# Patient Record
Sex: FEMALE | Race: WHITE | NOT HISPANIC OR LATINO | Employment: FULL TIME | URBAN - METROPOLITAN AREA
[De-identification: names, ages, dates, MRNs, and addresses within clinical notes are randomized per-mention and may not be internally consistent; named-entity substitution may affect disease eponyms.]

---

## 2017-01-12 ENCOUNTER — GENERIC CONVERSION - ENCOUNTER (OUTPATIENT)
Dept: OTHER | Facility: OTHER | Age: 37
End: 2017-01-12

## 2017-04-19 ENCOUNTER — GENERIC CONVERSION - ENCOUNTER (OUTPATIENT)
Dept: OTHER | Facility: OTHER | Age: 37
End: 2017-04-19

## 2017-04-25 ENCOUNTER — GENERIC CONVERSION - ENCOUNTER (OUTPATIENT)
Dept: OTHER | Facility: OTHER | Age: 37
End: 2017-04-25

## 2018-01-10 NOTE — MISCELLANEOUS
Provider Comments  Provider Comments:   PT WAS A NO SHOW FOR [facility] APPT I CALLED AND LEFT A MSG FOR PT TO CALL AND RESCHEDULE, SENT A LETTER AND ASK OUR  TO REACH OUT TO PATIENT      Signatures   Electronically signed by : SABI Crews; Jan 12 2017 11:01AM EST                       (Author)    Electronically signed by :  SABI Crews; Jan 12 2017 11:04AM EST                       (Author)

## 2018-01-11 NOTE — MISCELLANEOUS
Message  Message Free Text Note Form: To Whom It May Concern:    Ms Feliciano Vieira is under my direct care at [address]  She is currently pregnant and should not be lifting anything heavier than 25 lbs or climbing on ladders  Thank you for understanding  Dr Merlin Valdez MD  Ob/Gyn      Plan    1  (1) CBC/PLT/DIFF; Status:Active; Requested for:47Awj4437;    2  (1) GLUCOSE, 1HR PG (50gm Glu Challenge Preg-Pts); Status:Active; Requested   for:12Nfe3137;    3  (1) RPR; Status:Active;  Requested for:45Ouh1390;    4  OB Global Urine Dip Non-Automated - POC; Status:Resulted - Requires   Verification,Retrospective By Protocol Authorization;   Done: [id number] 08:18AM    Signatures   Electronically signed by : CORIN Daniel ; Jul 21 2016  8:34AM EST                       (Author)    Electronically signed by : Olga Nuno MD; Aug  1 2016  8:56AM EST

## 2018-01-12 NOTE — MISCELLANEOUS
Reason For Visit  Reason For Visit Free Text Note Form: Received request to contact pt  regarding missed appointment  Call to pt  and left message  Certified letter mailed to pt  today including introduction to Social Work services available at [facility]  Request for pt  to contact [facility] and contact information included as well  Letter scanned to EMR  Will continue to be available to provide support  Active Problems    1  Asthma (493 90) (J45 909)   2  Encounter for Depo-Provera contraception (V25 49) (Z30 42)   3  High grade squamous intraepithelial cervical dysplasia (795 04) (R87 613)   4  History of seasonal allergies (V15 09) (Z88 9)   5  Postpartum exam (V24 2) (Z39 2)   6  History of Tobacco use (305 1) (Z72 0)   7  Tooth pain (525 9) (K08 89)    Current Meds   1  Albuterol Sulfate SOLR;   Therapy: (Recorded:49Ojt9389) to Recorded   2  Claritin 10 MG Oral Tablet; TAKE 1 TABLET DAILY AS NEEDED; Therapy: 22CPW4916 to (Evaluate:10Nov2016)  Requested for: 20EAT3516; Last   Rx:11Oct2016 Ordered   3  Claritin TABS; Therapy: (Dana Jc) to Recorded   4  Colace 100 MG Oral Capsule; TAKE 1 CAPSULE TWICE DAILY AS NEEDED; Therapy: 76Kpn2850 to (Evaluate:51Cyu9113)  Requested for: 42Yoz3193; Last   Rx:53Pog8640 Ordered   5  EpiPen 2-Thuan 0 3 MG/0 3ML Injection Solution Auto-injector; 0 3 mg SC/IM x1; Info: may   repeat dose x1;   Therapy: 13KQT0181 to (Last Rx:89Olz3987)  Requested for: 31DPZ3556 Ordered   6  EpiPen 2-Thuan 0 3 MG/0 3ML Injection Solution Auto-injector; INJECT 0 3ML   INTRAMUSCULARLY AS DIRECTED; Therapy: 88SBY5500 to (Last AW:65REA5599)  Requested for: 99CFK0975 Ordered   7  Ferrous Sulfate 325 (65 Fe) MG Oral Tablet; take 1 tablet by mouth twice daily; Therapy: 46Exc6835 to (Evaluate:68Oln9268)  Requested for: 21Kit2612; Last   Rx:47Ozn0411 Ordered   8  MedroxyPROGESTERone Acetate 150 MG/ML Intramuscular Suspension; INJECT 150    MG Intramuscular every 11 weeks;    Therapy: 35CAY0679 to (Last Rx:74Diq9631)  Requested for: 22Ybp0254 Ordered   9  Prenatal Vitamins 0 8 MG Oral Tablet; TAKE 1 TABLET DAILY; Therapy: 15Apr2016 to (Evaluate:45Drv6456)  Requested for: 15Apr2016; Last   Rx:15Apr2016 Ordered   10  Ventolin  (90 Base) MCG/ACT Inhalation Aerosol Solution; INHALE 1 TO 2 PUFFS    EVERY 4 TO 6 HOURS AS NEEDED; Therapy: 99WZZ8657 to (Last Rx:82Hrt2438)  Requested for: 35Tmj1077 Ordered    Allergies    1  Codeine Sulfate TABS   2  Penicillins   3  Pineapple Concentrate LIQD   4  Sulfur   5  Vancomycin HCl SOLR    6  Bee sting   7   Other    Signatures   Electronically signed by : RAMIRO Caicedo; Jan 12 2017 12:17PM EST                       (Author)

## 2018-01-12 NOTE — MISCELLANEOUS
Message  Return to work or school:   Chapin Conner is under my professional care  She was seen in my office on 12/7/16   She is able to return to work on  12/8/16    She is able to perform activities of daily living without limitations  , She is able to participate in sports/gym without limitations  , She can perform work without limitations  , She can perform housework without limitations  Weight Bearing Status: Full Weight-Bearing           Signatures   Electronically signed by : CORIN Dey ; Dec  7 2016  5:43PM EST                       (Author)

## 2018-01-14 NOTE — PROGRESS NOTES
2016         RE: Jody Duncan                      To: [facility]   MR#: [id number]                                    [address] [place] [address]   :  [address]  ENC: [id number]                             [place], [address]   (Exam #: [id number])                           Fax: [phone]      The LMP of this 28year old,  G4, P3-0-0-3 patient was unknown, her   working JOSEFA is NOV 15 2016 and the current gestational age is 25 weeks 0   days by  [facility] [place] [address]  A sonographic examination was performed on 2016 using real time equipment  The ultrasound examination was   performed using abdominal & vaginal techniques  The patient has a BMI of   27 0  Her blood pressure today was 101/67  Earliest ultrasound found in her record: 16  12w2d  JOSEFA 11/15/16   Eduardo Reyna reports she is on prenatal vitamins and uses a Ventolin inhaler   prn for asthma  She has an allergy to penicillin, codeine, sulfur and   vancomycin  Her past medical history is significant for advanced maternal   age, asthma, and abnormal Pap smears showing high grade ADRIANA and she is   positive for high risk HPV found in this pregnancy  She has a colposcopy   scheduled for tomorrow  At her 12 week ob to visit she was given a   referral slip to schedule a sequential screen was ultimately she neglected   to do  She reports a history of 3 term vaginal deliveries that weighed   between 7 lbs  10 oz  and 8 lbs  7 oz  Her surgical history includes   removal of a bronchial cleft cyst in   Prior to pregnancy she was   smoking 2 packs of cigarettes daily which currently now is down to 5   cigarettes daily  She denies any use of alcohol or illicit drugs  She   denies any first generation family history of hypertension, diabetes,   thrombosis or congenital anomalies        Cardiac motion was observed at 141 bpm  INDICATIONS      fetal anatomical survey   advanced maternal age      Exam Types      LEVEL II   Transvaginal      RESULTS      Fetus # 1 of 1   Vertex presentation   Fetal growth appeared normal   Placenta Location = Anterior   No placenta previa   Placenta Grade = I      MEASUREMENTS (* Included In Average GA)      AC              14 3 cm        19 weeks 2 days* (38%)   BPD              4 6 cm        20 weeks 0 days* (52%)   HC              17 9 cm        20 weeks 1 day * (54%)   Femur            3 1 cm        19 weeks 6 days* (38%)      Nuchal Fold      4 8 mm      Humerus          3 0 cm        19 weeks 5 days  (46%)   Radius           2 5 cm        20 weeks 3 days   Ulna             2 8 cm        20 weeks 1 day   Tibia            2 9 cm        20 weeks 5 days  (66%)   Fibula           2 9 cm        19 weeks 6 days   Foot             3 2 cm        19 weeks 6 days      Cerebellum       2 1 cm        20 weeks 4 days   Biorbit          3 1 cm        20 weeks 1 day   CisternaMagna    1 6 mm      HC/AC           1 25   FL/AC           0 22   FL/BPD          0 68   EFW (Ac/Fl/Hc)   311 grams - 0 lbs 11 oz      THE AVERAGE GESTATIONAL AGE is 19 weeks 6 days +/- 10 days  AMNIOTIC FLUID         Largest Vertical Pocket = 4 2 cm   Amniotic Fluid: Normal      UTERINE ARTERIES                                  S/D   PI    RI    NOTCH       Left Uterine Artery        2 29  0 93  0 56       Right Uterine Artery       2 02  0 74  0 51      CERVICAL EVALUATION      The cervix appeared normal (Ultrasound Examination)  SUPINE      Cervical Length: 3 70 cm      OTHER TEST RESULTS           Funneling?: No             Dynamic Changes?: No        Resp  To TFP?: No      ANATOMY      Head                                    Normal   Face/Neck                               Normal   Th  Cav  Normal   Heart                                   See Details   Abd  Cav  Normal   Stomach                                 Normal   Right Kidney                            Normal   Left Kidney                             Normal   Bladder                                 Normal   Abd  Wall                               Normal   Spine                                   Normal   Extrems                                 Normal   Genitalia                               Normal   Placenta                                Normal   Umbl  Cord                              Normal   Uterus                                  Normal   PCI                                     Normal      ANATOMY DETAILS      Visualized Appearing Sonographically Normal:   HEAD: (Calvarium, BPD Level, Cavum, Lateral Ventricles, Choroid Plexus,   Cerebellum, Cisterna Magna);    FACE/NECK: (Neck, Nuchal Fold, Profile,   Orbits, Nose/Lips, Palate, Face);    TH  CAV : (Diaphragm); HEART:   (Four Chamber View, Proximal Left Outflow, Proximal Right Outflow, 3   Vessel Trachea, Interventricular Septum, Interatrial Septum, IVC, SVC,   Cardiac Axis, Cardiac Position);    ABD  CAV , STOMACH, RIGHT KIDNEY, LEFT   KIDNEY, BLADDER, ABD  WALL, SPINE: (Cervical Spine, Thoracic Spine, Lumbar   Spine, Sacrum);    EXTREMS: (Lt Humerus, Rt Humerus, Lt Forearm, Rt   Forearm, Lt Hand, Rt Hand, Lt Femur, Rt Femur, Lt Low Leg, Rt Low Leg, Lt   Foot, Rt Foot);    GENITALIA (Male), PLACENTA, UMBL  CORD, UTERUS, PCI      Not Visualized:   HEART: (Short Axis of Greater Vessels, Ductal Arch, Aortic Arch)      ADNEXA      The left ovary appeared normal and measured 2 2 x 1 7 x 1 7 cm with a   volume of 3 3 cc  The right ovary appeared normal and measured 3 5 x 3 1 x   1 4 cm with a volume of 7 9 cc        IMPRESSION      Portillo IUP   19 weeks and 6 days by this ultrasound  (JOSEFA=NOV 16 2016)   20 weeks and 0 days by [facility] Tri Sono  (JOSEFA=NOV 15 2016)   Vertex presentation   Fetal growth appeared normal   Regular fetal heart rate of 141 bpm   Anterior placenta   No placenta previa      GENERAL COMMENT      As per your request, a consultation was performed on your patient for the   following indication: AMA      Risks:   1  AMA- with a 0 97 % risk for any type of aneuploidy at the time of an   amniocentesis  We discussed the following options for genetic screening  US is not a good screen for aneuploidy as it may miss up to 50% of the   babies with a chromosome problem  She understands that Cell free DNA screening can  99% of babies   with Downs and 97% of babies with T18  The false positive rate for Downs   and T18 if found is 0 1%  The test also can  7/11 babies with T13   with false positive rate of 0 2%  It is not a great screen for other   chromosome problems which her age puts her at risk for  This test is new   and is not always covered by insurance  Lastly she was offered invasive genetic testing  Options for prenatal   diagnosis discussed included an amniocentesis  Risks and benefits of an   amniocentesis were reviewed including an increased risk of loss of 0 5%   over the baseline risk  It was explained that normal chromosomes and a   normal ultrasound do not guarantee a normal baby nor a normal pregnancy   outcome  The patient was informed of the findings and counseled about the   limitations of the exam in detecting all forms of fetal congenital   abnormalities  She denies any vaginal bleeding or uterine cramping/contractions  Exam shows she is comfortable and her abdomen is nontender  Patient verbalizes understanding and declines having an amniocentesis at   todays visit  She is planning on having Cell free DNA testing done   Thursday as she could not wait for verification of her insurance coverage   at the end of her scan today  Insurance was contacted and NIPT is covered   and she was notified to schedule her blood draw        Follow up recommended:   Recommend a follow up 32 week growth scan due to her age  Will review the   missed anatomy at her next [address]  She is coming in for her blood draw this   Thursday  JOSIAH Sanchez M D     Maternal-Fetal Medicine   Electronically signed 06/28/16 13:32

## 2018-01-14 NOTE — PROGRESS NOTES
SEP 22 2016         RE: Nalini Duncan                      To: Alber Daniels   MR#: [id number]                                    [address] [place] [address]   : TESS Ball  ENC: [id number]                             [place], [address]   (Exam #: [id number])                           Fax: [phone]      The LMP of this 39year old,  G4, P3-0-0-3 patient was unknown, her   working JOSEFA is NOV 15 2016 and the current gestational age is 26 weeks 2   days by 1st Trimester Sono  A sonographic examination was performed on SEP   22 2016 using real time equipment  The ultrasound examination was   performed using abdominal technique  The patient has a BMI of 28 3  Her   blood pressure today was 115/76  Earliest ultrasound found in her record: 16  12w2d  JOSEFA 11/15/16      Cardiac motion was observed at 129 bpm       INDICATIONS      advanced maternal age   fetal growth   Evaluate missed anatomy      Exam Types      Level I      RESULTS      Fetus # 1 of 1   Vertex presentation   Fetal growth appeared normal   Placenta Location = Anterior   No placenta previa   Placenta Grade = I      MEASUREMENTS (* Included In Average GA)      AC              28 7 cm        32 weeks 6 days* (55%)   BPD              7 9 cm        31 weeks 5 days* (30%)   HC              29 6 cm        32 weeks 2 days* (34%)   Femur            6 1 cm        31 weeks 3 days* (35%)      HC/AC           1 03   FL/AC           0 21   FL/BPD          0 77   EFW (Ac/Fl/Hc)  1951 grams - 4 lbs 5 oz                 (44%)      THE AVERAGE GESTATIONAL AGE is 32 weeks 1 day +/- 18 days        AMNIOTIC FLUID      Q1: 3 5      Q2: 5 1      Q3: 5 3      Q4: 3 9   JACINTO Total = 17 7 cm   Amniotic Fluid: Normal      ANATOMY DETAILS      Visualized Appearing Sonographically Normal:   HEAD: (Calvarium, BPD Level, Cavum, Lateral Ventricles, Cerebellum); HEART: (Four Chamber View, Proximal Right Outflow, 3 Vessel Trachea, Short   Axis of Greater Vessels, Ductal Arch, Aortic Arch, Cardiac Axis, Cardiac   Position);    STOMACH, RIGHT KIDNEY, LEFT KIDNEY, BLADDER, PLACENTA      ANATOMY COMMENTS         The prior fetal anatomic survey was limited the area of the short axis and   cardiac arches  These anatomic views were seen today as sonographically   normal within the inherent limitations of fetal ultrasound  IMPRESSION      Portillo IUP   32 weeks and 1 day by this ultrasound  (JOSEFA=NOV 16 2016)   32 weeks and 2 days by 1st Tri Sono  (JOSEFA=NOV 15 2016)   Vertex presentation   Fetal growth appeared normal   Regular fetal heart rate of 129 bpm   Anterior placenta   No placenta previa      GENERAL COMMENT      On exam today the patient appears well, in no acute distress, and denies   any complaints  Her abdomen is non-tender  The fetal anatomic survey is now complete  There is no sonographic   evidence of fetal abnormalities at this time  The remainder of the survey   was completed previously  There has been appropriate interval fetal   growth  Good fetal movement and tone are seen  The amniotic fluid volume   appears normal   The placenta is anterior and it appears sonographically   normal   The patient was informed of today's findings and all of her   questions were answered  The limitations of ultrasound were reviewed with   the patient, which she accepts  Precautions and fetal kick counts were   reviewed  Recommend the patient return as clinically indicated  Thank you very much for allowing us to participate in the care of this   very nice patient  Should you have any questions, please do not hesitate   to contact our office  JOSIAH Urias M D     Electronically signed 09/22/16 13:30

## 2018-01-16 NOTE — MISCELLANEOUS
Provider Comments  Provider Comments:   PT NO SHOW FOR PN LETTER SENT      Signatures   Electronically signed by : Lisa Sanz, ; Aug 23 2016  2:49PM EST                       (Author)    Electronically signed by : SABI Griffin;  Aug 24 2016 12:07PM EST                       (Acknowledgement)

## 2019-01-11 ENCOUNTER — INITIAL PRENATAL (OUTPATIENT)
Dept: OBGYN CLINIC | Facility: CLINIC | Age: 39
End: 2019-01-11

## 2019-01-11 VITALS
SYSTOLIC BLOOD PRESSURE: 102 MMHG | HEIGHT: 66 IN | DIASTOLIC BLOOD PRESSURE: 68 MMHG | WEIGHT: 186.2 LBS | BODY MASS INDEX: 29.92 KG/M2 | HEART RATE: 98 BPM

## 2019-01-11 DIAGNOSIS — Z3A.18 18 WEEKS GESTATION OF PREGNANCY: Primary | ICD-10-CM

## 2019-01-11 PROCEDURE — 99211 OFF/OP EST MAY X REQ PHY/QHP: CPT

## 2019-01-11 RX ORDER — LANOLIN ALCOHOL/MO/W.PET/CERES
400 CREAM (GRAM) TOPICAL DAILY
COMMUNITY
End: 2019-04-18 | Stop reason: SDUPTHER

## 2019-01-11 NOTE — PROGRESS NOTES
OB Intake  o Patient presents for OB intake interview  o Accompanied by: Self    o V2H1002    o Hx of  delivery prior to 36 weeks 6 days:                             no  o LMP:   Patient's last menstrual period was 10/01/2018 (approximate)  o U/S date: 10/30/18   -  7 weeks  4 days  o Estimated Date of Delivery: None noted  - confirmed by U/S    o Signs and Symptoms of pregnancy:               nausea  - Constipation:    no  - Headaches:   no  - Cramping/spotting:    no  - PICA cravings:    no  - Diabetes: If you answer yes, please order 1 hr gtt testing, 50grams   History of gestational diabetes    no   BMI >35     no   Advance maternal age >35   yes   First degree relative with type 2 diabetes    no   History of PCOS   no   Current metformin use    no   Prior history of macrosomia or LGA    no  o Immunization Record  Immunization History   Administered Date(s) Administered    Influenza TIV (IM) 2014    Influenza, Quadrivalent (nasal) 2016    Tdap 2016   -   o Tdap:  - Counseled to be given after 28 weeks  o Influenza vaccine discussed  o MRSA questionnaire:     negative  o Dental visit within last 6 months  - no   - If no, recommendations discussed  Interview education:  Educational material provided on After Visit Summary (AVS)   Handouts given at todays visit  o [facility] [employer] [facility] phone application guide  o [address] [place] [address] support center  o CDCs Response to [address] [place] [address] Maternal Fetal Medicine  - Sequential screening pamphlet  - Cystic fibrosis pamphlet  o JOSEFA letter given  - [address]  - Work   - Dentist    N[facility] Family [facility]:    No  ONAF form submitted:    No    MyChart activated (not 1518 years of age)  No  - Code provided:   Yes    Interview done by: Penny Garibay RN 19    1  18 weeks gestation of pregnancy      [facility]  Pregnant with twins   Records are in care everywhere    Prenatal blood work and genetic testing done at [facility]  Last U/S was done at [facility] on 1/3/19  Hx of 2 blood clots  One when teenager, second one was during her pregnancy in 2008  [facility] wanted the patient to be on Lovenox or Asprin, patient refused both of them  SABI spoke with Dr Ginny Soto regarding hx of blood clots  Since she had a recent U/S on 1/3/19  Dr Ginny Soto feels comfortable with the patient being seen in 2 weeks for her level 2 U/S       Influenza vaccine given already this year

## 2019-01-11 NOTE — PATIENT INSTRUCTIONS
Warning signs during pregnancy      When to Call    1  Vaginal bleeding  2  Sharp abdominal pain that does not go away  3  Fever (more than 100 4 and is not relieved by Tylenol)  4  Persistent vomiting lasting greater than 24 hours  5  Chest pain   6  Pain or burning when you urinate  7  Severe headache that doesn't resolve with Tylenol  8  Blurred vision or seeing spots in your vision  9  Sudden swelling of your face or hands  10  Redness, swelling or pain in a leg  11  A sudden weight gain in just a few days  12  Decrease in your baby's movement (after 28 weeks or the 6th month of pregnancy)  13  A loss of watery fluid from your vagina - can be a gush, a trickle or continuous wetness  14  After 20 weeks of pregnancy, rhythmic cramping (greater than 4 per hour) or menstrual like low/pelvic pain          Medications and Pregnancy: The following list of over-the-counter medications is usually considered safe to take during pregnancy  Take care to not double up on products containing acetaminophen (Tylenol)  Colds/Sore Throat   Robitussin DM - Plain (guaifenesin)   Saline nasal spray   Warm salt water gargle   Cepacol throat lozenges or mouthwash (cetylpyridinium)   Sucrets (hexylresoricinol)    Allergy  AVOID the D - or DECONGESTANT   Claritin (loratadine)   Zrytec (cetirizine)   Allerga (fexofenadine)   Headache/ Aches and Pains    Headaches / Aches and Pains:   Tylenol (acetaminophen)  Do NOT exceed more than 3000 mg of Tylenol in a 24-hour period      Heartburn   Mylanta (aluminum hydroxide/simethicone, magnesium hydroxide)   Maalaox (aluminum magnesium hydroxide, magnesium hydroxide)   Tums (calcium carbonate)   Riopan (magaldrate)    Constipation   Colace (docusate sodium)   Surfak (docusate sodium)   MiraLAX   Glycerin suppositories   Fleets enema (sodium phosphate & sodium biphosphate)    Nausea/Vomiting   Vitamin B6 (pyridoxine) - May take 50 mg at bedtime, 25 mg in the morning, 25 mg in the afternoon   Unisom (doxylamine) - May use for nausea/vomiting - (cut a 25 mg tablet in half)  May cause drowsiness  Sleep   Benadryl (diphenhydramine) - Take 1-2 tablets as needed at bedtime   Unisom (doxylamine) 25 mg tablet - As needed at bedtime   Melatonin 5 mg tablet - As needed at bedtime    Generally the generic form of medicine is usually lower priced than the brand name form of the medicine  Pregnancy   AMBULATORY CARE:   What you need to know about pregnancy:  A normal pregnancy lasts about 40 weeks  The first trimester lasts from your last period through the 12th week of pregnancy  The second trimester lasts from the 13th week of your pregnancy through the 23rd week  The third trimester lasts from your 24th week of pregnancy until your baby is born  If you know the date of your last period, your healthcare provider can estimate your due date  You may give birth to your baby any time from 37 weeks to 2 weeks after your due date  Seek care immediately if:   · You develop a severe headache that does not go away  · You have new or increased vision changes, such as blurred or spotted vision  · You have new or increased swelling in your face or hands  · You have pain or cramping in your abdomen or low back  · You have vaginal bleeding  Contact your healthcare provider or obstetrician if:   · You have abdominal cramps, pressure, or tightening  · You have a change in vaginal discharge  · You cannot keep food or drinks down, and you are losing weight  · You have chills or a fever  · You have vaginal itching, burning, or pain  · You have yellow, green, white, or foul-smelling vaginal discharge  · You have pain or burning when you urinate, less urine than usual, or pink or bloody urine  · You have questions or concerns about your condition or care    Body changes that may occur during your pregnancy:   · Breast changes  you will experience include tenderness and tingling during the early part of your pregnancy  Your breasts will become larger  You may need to use a support bra  You may see a thin, yellow fluid, called colostrum, leak from your nipples during the second trimester  Colostrum is a liquid that changes to milk about 3 days after you give birth  · Skin changes and stretch marks  may occur during your pregnancy  You may have red marks, called stretch marks, on your skin  Stretch marks will usually fade after pregnancy  Use lotion if your skin is dry and itchy  The skin on your face, around your nipples, and below your belly button may darken  Most of the time, your skin will return to its normal color after your baby is born  · Morning sickness  is nausea and vomiting that can happen at any time of day  Avoid fatty and spicy foods  Eat small meals throughout the day instead of large meals  Louisa may help to decrease nausea  Ask your healthcare provider about other ways of decreasing nausea and vomiting  · Heartburn  may be caused by changes in your hormones during pregnancy  Your growing uterus may also push your stomach upward and force stomach acid to back up into your esophagus  Eat 4 or 5 small meals each day instead of large meals  Avoid spicy foods  Avoid eating right before bedtime  · Constipation  may develop during your pregnancy  To treat constipation, eat foods high in fiber such as fiber cereals, beans, fruits, vegetables, whole-grain breads, and prune juice  Get regular exercise and drink plenty of water  Your healthcare provider may also suggest a fiber supplement to soften your bowel movements  Talk to your healthcare provider before you use any medicines to decrease constipation  · Hemorrhoids  are enlarged veins in the rectal area  They may cause pain, itching, and bright red bleeding from your rectum  To decrease your risk of hemorrhoids, prevent constipation and do not strain to have a bowel movement   If you have hemorrhoids, soak in a tub of warm water to ease discomfort  Ask your healthcare provider how you can treat hemorrhoids  · Leg cramps and swelling  may be caused by low calcium levels or the added weight of pregnancy  Raise your legs above the level of your heart to decrease swelling  During a leg cramp, stretch or massage the muscle that has the cramp  Heat may help decrease pain and muscle spasms  Apply heat on your muscle for 20 to 30 minutes every 2 hours for as many days as directed  · Back pain  may occur as your baby grows  Do not stand for long periods of time or lift heavy items  Use good posture while you stand, squat, or bend  Wear low-heeled shoes with good support  Rest may also help to relieve back pain  Ask your healthcare provider about exercises you can do to strengthen your back muscles  Stay healthy during your pregnancy:   · Eat a variety of healthy foods  Healthy foods include fruits, vegetables, whole-grain breads, low-fat dairy foods, beans, lean meats, and fish  Drink liquids as directed  Ask how much liquid to drink each day and which liquids are best for you  Limit caffeine to less than 200 milligrams each day  Limit your intake of fish to 2 servings each week  Choose fish low in mercury such as canned light tuna, shrimp, crab, salmon, cod, or tilapia  Do not  eat fish high in mercury such as swordfish, tilefish, maximiliano mackerel, and shark  · Take prenatal vitamins as directed  Your need for certain vitamins and minerals, such as folic acid, increases during pregnancy  Prenatal vitamins provide some of the extra vitamins and minerals you need  Prenatal vitamins may also help to decrease the risk of certain birth defects  · Ask how much weight you should gain during your pregnancy  Too much or too little weight gain can be unhealthy for you and your baby  · Talk to your healthcare provider about exercise  Moderate exercise can help you stay fit   Your healthcare provider will help you plan an exercise program that is safe for you during pregnancy  · Do not smoke  If you smoke, it is never too late to quit  Smoking increases your risk of a miscarriage and other health problems during your pregnancy  Smoking can cause your baby to be born too early or weigh less at birth  Ask your healthcare provider for information if you need help quitting  · Do not drink alcohol  Alcohol passes from your body to your baby through the placenta  It can affect your baby's brain development and cause fetal alcohol syndrome (FAS)  FAS is a group of conditions that causes mental, behavior, and growth problems  · Talk to your healthcare provider before you take any medicines  Many medicines may harm your baby if you take them when you are pregnant  Do not take any medicines, vitamins, herbs, or supplements without first talking to your healthcare provider  Never use illegal or street drugs (such as marijuana or cocaine) while you are pregnant  Safety tips:   · Avoid hot tubs and saunas  Do not use a hot tub or sauna while you are pregnant, especially during your first trimester  Hot tubs and saunas may raise your baby's temperature and increase the risk of birth defects  · Avoid toxoplasmosis  This is an infection caused by eating raw meat or being around infected cat feces  It can cause birth defects, miscarriages, and other problems  Wash your hands after you touch raw meat  Make sure any meat is well-cooked before you eat it  Avoid raw eggs and unpasteurized milk  Use gloves or ask someone else to clean your cat's litter box while you are pregnant  · Ask your healthcare provider about travel  The most comfortable time to travel is during the second trimester  Ask your healthcare provider if you can travel after 36 weeks  You may not be able to travel in an airplane after 36 weeks  He may also recommend that you avoid long road trips    Follow up with your healthcare provider or obstetrician as directed:  Go to all of your prenatal visits during your pregnancy  Write down your questions so you remember to ask them during your visits  © 2017 [employer]  Information is for End User's use only and may not be sold, redistributed or otherwise used for commercial purposes  All illustrations and images included in CareNotes® are the copyrighted property of [employer]  or Fredy Willis  The above information is an  only  It is not intended as medical advice for individual conditions or treatments  Talk to your doctor, nurse or pharmacist before following any medical regimen to see if it is safe and effective for you  Pregnancy Diet   WHAT YOU NEED TO KNOW:   What is a healthy diet during pregnancy? A healthy diet during pregnancy is a meal plan that provides the amount of calories and nutrients you need during pregnancy  Your body needs extra calories and nutrients to support your growing baby  You need to gain the right amount of weight for a healthy baby and pregnancy  Babies born at a healthy weight have a lower risk of certain health problems at birth and later in life  A healthy diet may help you avoid gaining too much weight  Too much weight gain may cause problems for you during your pregnancy and delivery  What should I avoid while I am pregnant? · Alcohol:  Do not drink alcohol during pregnancy  Alcohol can increase your risk of a miscarriage (losing your baby)  Your baby may also be born too small and have other health problems, such as learning problems later in life  · Caffeine: It is not clear how caffeine affects pregnancy  Limit your intake of caffeine to avoid possible health problems  Caffeine may be found in coffee, tea, cola, sports drinks, and chocolate  · Foods that contain mercury:  Mercury is naturally found in almost all types of fish and shellfish   Some types of fish absorb higher levels of mercury that can be harmful to an unborn baby  Eat only fish and shellfish that are low in mercury  Each week, you may eat up to 12 ounces of fish or shellfish that have low levels of mercury  These include shrimp, canned light tuna, salmon, pollock, and catfish  Eat only 6 ounces of albacore (white) tuna per week  Albacore tuna has more mercury than canned tuna  Do not eat shark, swordfish, maximiliano mackerel, or tilefish  · Raw and undercooked foods: You should not eat undercooked or raw meat, poultry, eggs, fish, or shellfish (shrimp, crab, lobster)  Cook leftover foods and ready-to-eat foods such as hot dogs until they are steaming hot  · Unpasteurized food:  Unpasteurized foods are foods that have not gone through the heating process (pasteurization) that destroys bacteria  You should not drink milk, juice, or cheese that has not been pasteurized  This includes Brie, feta, Camembert, blue, and [place] cheeses  Which foods can I eat while I am pregnant? Eat a variety of foods from each of the food groups listed below  Your dietitian will tell you how many servings you should have from each food group each day to get enough calories  The amount of calories you need depends on your daily activity, your weight before pregnancy, and current weight gain  Healthcare providers divide pregnancy into 3 periods of time called trimesters  In the first trimester, you usually do not need extra calories  In the second and third trimesters, most women should eat about 300 extra calories each day  · Fruits and vegetables:  Half of your plate should contain fruits and vegetables  ¨ Fruits:  Choose fresh, canned, or dried fruit as often as possible  ¨ 1 cup of sliced, diced, cooked, or canned fruit (canned in light syrup or 100% juice)    ¨ 1 large peach, orange, or banana    ¨ ½ cup of dried fruit    ¨ 1 cup of fruit juice    ¨ Vegetables:  Eat more dark green, red, and orange vegetables   Dark green vegetables include broccoli, spinach, jose lettuce, and alberto greens  Examples of orange and red vegetables are carrots, sweet potatoes, winter squash, oranges, and red peppers  ¨ 1 cup of cooked or raw vegetables    ¨ 1 cup of vegetable juice    ¨ 2 cups of raw leafy greens    · Grains:  Half of the grains you eat each day should be whole grains  ¨ Whole grains:      ¨ ½ cup of cooked brown rice or cooked oatmeal    ¨ 1 cup (1 ounce) of whole-grain dry cereal    ¨ 1 slice of 705% whole-wheat or rye bread    ¨ 3 cups of popped popcorn    ¨ Other grains:      ¨ ½ cup of cooked white rice or pasta    ¨ ½ of an English muffin    ¨ 1 small flour or corn tortilla    ¨ 1 mini-bagel    · Dairy foods:  Choose fat-free or low-fat dairy foods:    ¨ 1½ ounces of hard cheese (mozzarella, Swiss, cheddar)     ¨ 1 cup (8 ounces) of low-fat or fat-free milk or yogurt    ¨ 1 cup of low-fat frozen yogurt or pudding    · Meat and other protein sources:  Choose lean meats and poultry  Bake, broil, and grill meat instead of frying it  Include a variety of seafood in place of some meat and poultry each week  Eat a variety of protein foods:    ¨ ½ ounce of nuts (12 almonds, 24 pistachios, 7 walnut halves) or 1 tablespoon of peanut butter (1 ounce)    ¨ ¼ cup of soy tofu or tempeh (1 ounce)    ¨ 1 egg    ¨ ¼ cup of cooked dried beans, peas, or lentils (1 ounce)    ¨ 1 small chicken breast or 1 small trout (about 3 ounces)    ¨ 1 salmon steak (4 to 6 ounces)    ¨ 1 small lean hamburger (2 to 3 ounces)    · Fats:  Limit saturated fats, trans fats, and cholesterol  These unhealthy fats are found in shortening, butter, stick margarine, and animal fat  Choose healthy fats such as polyunsaturated and monounsaturated fats:     ¨ 1 tablespoon of canola, olive, corn, sunflower, or soybean oil    ¨ 1 tablespoon of soft margarine    ¨ 1 teaspoon of mayonnaise    ¨ 2 tablespoons of salad dressing    ¨ ½ of an avocado  What vitamin and mineral supplements may I need? Your healthcare provider will tell you if you need a supplement and the type you should take  Talk to your healthcare provider before you take any other kind of supplement, including herbal (natural) supplements  · Prenatal vitamins:  Eat a variety of healthy foods, even if you take a prenatal vitamin  If you forget to take your vitamin, do not take double the amount the next day  · Folic acid:  You need at least 942 mcg of folic acid each day before you get pregnant  Folic acid helps to form your baby's brain and spinal cord in early pregnancy  During pregnancy, your daily need for folic acid increases to about 600 mcg  Get folic acid each day by eating citrus fruits and juices, green leafy vegetables, liver, or dried beans  Folic acid is also added to foods such as breakfast cereals, bread products, flour, and pasta  · Iron:  Iron is a mineral the body needs to make hemoglobin, which is a part of red blood cells  Hemoglobin helps your blood carry oxygen from the lungs to the rest of your body  Foods that are good sources of iron are meat, poultry, fish, beans, spinach, and fortified cereals and breads  Your body will absorb iron better from non-meat sources if you have a source of vitamin C at the same time  Drink tea and coffee separately from iron-fortified foods and iron supplements  You need about 30 mg of iron each day during pregnancy  · Calcium and vitamin D:  Women who do not eat dairy products may need a calcium and vitamin D supplement  Talk to your healthcare provider about calcium supplements if you do not regularly eat good sources of calcium  The amount of calcium you need is about 1,300 mg if you are between 15and 25years old and 1,000 mg if you are 23to 48years old  What diet changes may help if I have morning sickness? Morning sickness is common during the first few months of pregnancy  You may feel nauseated, and you may vomit several times each day   To improve symptoms of morning sickness, eat small, frequent meals instead of 3 large meals  Foods high in carbohydrate, such as crackers, dry toast, and pasta, may be easier for you to eat  Drink liquids between meals rather than with meals  What diet changes may decrease constipation? A high-fiber diet can improve the symptoms of constipation  Whole-grain breakfast cereals, whole-grain breads, and prune juice are high in fiber  Raw fruits and vegetables, and cooked beans are also good sources of fiber  It may also be helpful to increase your intake of fluids and get regular physical activity  Talk with your healthcare provider before you begin any exercise program    What diet changes may decrease heartburn? To improve the symptoms of heartburn, do not lie down right after you eat  When you do lie down, sleep with your head slightly elevated  Eat small, frequent meals instead of 3 large meals  It may also be helpful to avoid caffeine, chocolate, and spicy foods  How can I get enough calcium if I cannot tolerate dairy foods? If you cannot drink milk or eat dairy foods, try lactose-free or lactose-reduced milk or calcium-fortified soy milk  Ask your healthcare provider about pills you can take to help you digest milk products  Eat other drinks and foods that are fortified with calcium, such as orange juice  What other healthy guidelines should I follow? · Vegetarians and vegans: If you are a vegetarian or vegan, get enough protein, vitamin B12, and iron during your pregnancy  Some non-meat sources of these nutrients are fortified cereals, nut butters, soy products (tofu and soymilk), nuts, grains, and legumes  These nutrients are also found in eggs and milk products  · Cravings: You may have cravings for certain foods during your pregnancy  Foods that are high in calories, fat, and sugar should not replace healthy food choices   Some women have cravings for unusual substances such as beverly, dirt, laundry starch, ice, and manuela  This condition is called pica  These may lead to health problems such as anemia and cause other health problems  When should I contact my healthcare provider? · You are losing weight without trying  · You have cravings for substances such as beverly, dirt, laundry starch, or ice  · You have questions or concerns about your condition or care  CARE AGREEMENT:   You have the right to help plan your care  Discuss treatment options with your caregivers to decide what care you want to receive  You always have the right to refuse treatment  The above information is an  only  It is not intended as medical advice for individual conditions or treatments  Talk to your doctor, nurse or pharmacist before following any medical regimen to see if it is safe and effective for you  © 2017 [address] [employer] Information is for End User's use only and may not be sold, redistributed or otherwise used for commercial purposes  All illustrations and images included in CareNotes® are the copyrighted property of [employer]  or Fredy Willis  Nausea and Vomiting in Pregnancy   AMBULATORY CARE:   Nausea and vomiting in pregnancy  can happen any time of day  These symptoms usually start before the 9th week of pregnancy, and end by the 14th week (second trimester)  Some women can have nausea and vomiting for a longer time  These symptoms can affect some women throughout the entire pregnancy  Nausea and vomiting do not harm your baby  These symptoms can make it hard for you to do your daily activities  Seek care immediately if:   · You have signs of dehydration  Examples are dark yellow urine, dry mouth and lips, dry skin, fast heartbeat, and urinating less than usual     · You have severe abdominal pain  · You feel too weak or dizzy to stand up  · You see blood in your vomit or bowel movements    Contact your healthcare provider if:   · You vomit more than 4 times in 1 day     · You have not been able to keep liquids down for more than 1 day  · You lose more than 2 pounds  · You have a fever  · Your nausea and vomiting continue longer than 14 weeks  · You have questions or concerns about your condition or care  Treatment  for nausea and vomiting in pregnancy is usually not needed  You can make changes in the foods you eat and in your activities to help manage your symptoms  You may need to try several things to learn what works for you  Talk to your healthcare provider if your symptoms do not decrease with the changes suggested below  You may need vitamin B6 and medicine if these changes do not help, or your symptoms become severe  Nutrition changes you can make to manage nausea and vomiting:   · Eat small meals throughout the day instead of 3 large meals  You may be more likely to have nausea and vomiting when your stomach is empty  Eat foods that are low in fat and high in protein  Examples are lean meat, beans, turkey, and chicken without the skin  Eat a small snack, such as crackers, dry cereal, or a small sandwich before you go to bed  · Eat some crackers or dry toast before you get out of bed in the morning  Get out of bed slowly  Sudden movements could cause you to get dizzy and nauseated  · Eat bland foods when you feel nauseated  Examples of bland foods include dry toast, dry cereal, plain pasta, white rice, and bread  Other bland foods include saltine crackers, bananas, gelatin, and pretzels  Avoid spicy, greasy, and fried foods  Avoid any other foods that make you feel nauseated  · Drink liquids that contain ginger  Drink ginger ale made with real ginger or ginger tea made with fresh grated ginger  Ginger capsules or ginger candies may also help to decrease nausea and vomiting  · Drink liquids between meals instead of with meals  Wait at least 30 minutes after you eat to drink liquids   Drink small amounts of liquids often throughout the day to prevent dehydration  Ask how much liquid you should drink each day  Other changes you can make to manage nausea and vomiting:   · Avoid smells that bother you  Strong odors may cause nausea and vomiting to start, or make it worse  Take a short walk, turn on a fan, or try to sleep with the window open to get fresh air  When you are cooking, open windows to get rid of smells that may cause nausea  · Do not brush your teeth right after you eat  if it makes you nauseated  · Rest when you need to  Start activity slowly and work up to your usual routine as you start to feel better  · Talk to your healthcare provider about your prenatal vitamins  Prenatal vitamins can cause nausea for some women  Try taking your prenatal vitamin at night or with a snack  If this change does not help, your healthcare provider may recommend a different type of vitamin  · Do not use any medicines, vitamins, or supplements to manage your symptoms without asking your healthcare provider  Many medicines can harm an unborn baby  · Light to moderate exercise  may help to decrease your symptoms  It may also help you to sleep better at night  Ask your healthcare provider about the best exercise plan for you  Follow up with your healthcare provider as directed:  Write down your questions so you remember to ask them during your visits  © 2017 [address] [facility] Information is for End User's use only and may not be sold, redistributed or otherwise used for commercial purposes  All illustrations and images included in CareNotes® are the copyrighted property of [employer]  or Fredy Willis  The above information is an  only  It is not intended as medical advice for individual conditions or treatments  Talk to your doctor, nurse or pharmacist before following any medical regimen to see if it is safe and effective for you        Zika Virus: Information for Pregnant Women   AMBULATORY CARE:   Zika virus  Zika virus is carried by mosquitos  The virus is spread to a human through the bite of an infected mosquito  The virus may also be passed from one person to another through sex  [place] virus may be passed from a mother to her unborn baby  This may cause birth defects such as poor brain development  It may also cause pregnancy loss  There is currently no vaccine to prevent Zika virus infection  Common signs and symptoms include the following: You may not have signs or symptoms of Zika virus  If you develop symptoms, they may happen suddenly and last for 2 to 7 days  You may have any of the following:  · Fever    · Rash    · Headache    · Muscle or joint pain    · Red or itchy eyes  Contact your healthcare provider if:   · You think you have been exposed to [place] virus  · You have symptoms of Zika virus  · You have questions or concerns about your condition or care  Prevent mosquito bites:  Do not travel to areas where [place] virus is common  Ask your healthcare provider where it is safe to travel  Prevent mosquito bites to help decrease your risk for Zika virus infection:  · Apply insect repellent  Ask your healthcare provider which insect repellent is right for you  Most insect repellents are safe to use during pregnancy  Follow directions on the insect repellent container  The following is a list of tips for insect repellent use:     ¨ Do not  apply insect repellent to skin under clothing  ¨ Apply sunscreen before you apply insect repellent  ¨ Wear insect repellent any time you plan to be outside  Wear insect repellent at all times if you travel or live in a high-risk area  Reapply insect repellent as directed  ¨ Apply insect repellent every day for 3 weeks after you travel to high-risk areas  · Wear a long-sleeved shirt and pants  This will protect your skin from mosquito bites  · Use screens and nets  Use a mosquito net around your bed   When you travel, choose a place to stay with screens on all windows and doors  Place screens over windows and doors in your home  Fix holes or tears in screens and nets, or buy new screens and nets  · Keep doors and windows closed  If possible, use air conditioning to cool your home  · Apply insect repellent to clothing and gear  This includes boots, pants, socks, and tents  Do this when you camp, hike, or work outside  You can also buy clothing and gear that comes with insect repellent already on it  · Clean and empty containers of water once a week  Examples are animal bowls, buckets of water, gutters, flower vases, and bird baths  Mosquitoes lay eggs near water  Empty the water and scrub these containers with soap and water  Keep water containers covered with a tight-fitting lid, when possible  · Use insect sprays inside and outside of your home  Use an insect spray that is safe to use inside of your home  Place a device that sprays mosquitoes outside of your home  Place it in a dark, cool, area  Ask your healthcare provider where to buy these items  Follow directions that come with these products  Practice safe sex during pregnancy: The following will decrease your risk for Zika virus  It will also decrease the risk that you will pass Zika virus to your baby  · Do not have sex with a man or a woman who is infected with Zika virus while you are pregnant  Do not have sex with a man or a woman who has been exposed to [place] virus while you are pregnant  Your partner may be at risk for exposure if he or she has traveled to an area with [place] infection  Sex includes oral, vaginal, and anal sex  · If you choose to have sex during pregnancy, use a condom or latex barrier every time you have sex  Use protection for all types of sexual contact with a man or woman  This includes oral, vaginal, and anal sex  Use a new condom or latex barrier each time you have sex  Make sure that the condom fits and is put on correctly   If you are allergic to latex, use a nonlatex product such as polyurethane  For the most up-to-date information on Zika virus:  Knowledge about the [place] virus is changing quickly  Get the most up-to-date information at:  · Centers for Disease Control and Prevention Information on [address]  [address] [facility]  Phone: 2- 803 - [phone]  Web Address: [facility]  Follow up with your healthcare provider as directed:  Write down your questions so you remember to ask them during your visits  © 2017 [address] [employer] Information is for End User's use only and may not be sold, redistributed or otherwise used for commercial purposes  All illustrations and images included in CareNotes® are the copyrighted property of A D A M , Inc  or Fredy Willis  The above information is an  only  It is not intended as medical advice for individual conditions or treatments  Talk to your doctor, nurse or pharmacist before following any medical regimen to see if it is safe and effective for you  Influenza Vaccine   AMBULATORY CARE:   The influenza vaccine  is an injection given to help prevent influenza (flu)  The flu is caused by a virus  The virus spreads from person to person through coughing and sneezing  Several types of viruses cause the flu  The viruses change over time, so new vaccines are made each year  The vaccine begins to protect you about 2 weeks after you get it  The flu shot usually injected into your upper arm  It may be given in your thigh  You may get a vaccine with a weak or dead virus  Call 911 for any of the following:   · Your mouth and throat are swollen  · You are wheezing or have trouble breathing  · You have chest pain or your heart is beating faster than normal for you  · You feel like you are going to faint  Seek care immediately if:   · Your face is red or swollen      · You have hives that spread over your body     · You feel weak or dizzy  Contact your healthcare provider if:   · You have increased pain, redness, or swelling around the area where the shot was given  · You have questions or concerns about the influenza vaccine  When to get the influenza vaccine: The influenza vaccine is offered every year starting in September or October  Get the influenza vaccine as soon as it is available  Children 6 months to 6years old need 2 doses during the first year they get the vaccine  The 2 doses should be given at least 4 weeks apart  It is best if the same type of vaccine is given both times  The child can then receive 1 dose each year  Children 9 years or older should get 1 dose each year  Who should get the flu shot:   · Infants 6 months or older    · Any healthy adult who would like to decrease the risk for the flu    · Anyone living with or caring for children younger than 5 years     · Healthcare workers    · Anyone who lives in a long-term care facility    · Anyone who has chronic health problems, such as asthma, diabetes, or blood disorders    · Anyone who has a weak immune system    · Women who are or will be pregnant during the flu season  Who should not get the flu shot:  If you have an egg allergy, ask your healthcare provider if it is safe to get the flu shot  You will need to be closely monitored by a healthcare provider while you receive the vaccine, and for an hour or more after  The following should not get the flu shot:  · Infants younger than 6 months     · Anyone who has had an allergic reaction to the flu shot    · Anyone who is sick or has a fever    · Anyone who received a diagnosis of Guillain-Barré syndrome within 6 weeks of getting a flu vaccine    · Anyone who is allergic to thimerosal (mercury)  Risks of the influenza vaccine: The flu shot may cause mild symptoms, such as a fever, headache, and muscle aches   It may also cause mild to moderate soreness or redness at the area where you were given the shot  The nasal spray may cause a fever, runny or stuffy nose, headache, muscle aches, or vomiting  You may still get the flu after you receive the influenza vaccine  If you are allergic to eggs, ask about an egg-free vaccine  You may have an allergic reaction to the vaccine  This can be life-threatening  Follow up with your healthcare provider as directed:  Write down your questions so you remember to ask them during your visits  © 2017 [address] [employer]  Information is for End User's use only and may not be sold, redistributed or otherwise used for commercial purposes  All illustrations and images included in CareNotes® are the copyrighted property of A D A M , Inc  or Fredy Alba  The above information is an  only  It is not intended as medical advice for individual conditions or treatments  Talk to your doctor, nurse or pharmacist before following any medical regimen to see if it is safe and effective for you  Tdap:     Diphtheria/Acellular Pertussis/Tetanus Booster Vaccine (Tdap) (By injection)   Pertussis Vaccine, Acellular (per-TUS-iss VAX-een, c-YOWC-ufb-lar), Reduced Diphtheria Toxoid (ree-DOOST dif-THEER-ee-a TOX-oyd), Tetanus Toxoid (TET-a-nus TOX-oyd)  Protects against infections caused by tetanus (lockjaw), diphtheria, or pertussis (whooping cough)  This is a booster vaccine  Brand Name(s): Adacel, Boostrix   There may be other brand names for this medicine  When This Medicine Should Not Be Used: You should not receive this vaccine if you have had an allergic reaction to the separate or combined tetanus, diphtheria, or pertussis vaccine  You should not receive this vaccine if you have had seizures, mental changes, or any other serious reaction within 7 days after you received a pertussis vaccine  How to Use This Medicine:   Injectable  · A nurse or other health provider will give you this medicine    · Your doctor will prescribe your exact dose and tell you how often it should be given  This medicine is given as a shot into one of your muscles  · You may receive other vaccines at the same time as this one, but in a different body area  You should receive patient instructions for all of the vaccines  Talk to your doctor or nurse if you have questions  · Read and follow the patient instructions that come with this medicine  Talk to your doctor or pharmacist if you have any questions  Drugs and Foods to Avoid:   Ask your doctor or pharmacist before using any other medicine, including over-the-counter medicines, vitamins, and herbal products  · Make sure the doctor knows if you are receiving a treatment or medicine that weakens your immune system  This includes radiation treatment, steroid medicines (such as dexamethasone, hydrocortisone, methylprednisolone, prednisolone, prednisone, Medrol®), or cancer medicines  Warnings While Using This Medicine:   · Make sure your doctor knows if you are pregnant or breastfeeding or have epilepsy, a weak immune system, or a history of a stroke  Tell your doctor if you are sick or have a fever  · Tell your doctor about any reaction you had after you received a vaccine  This includes fainting, seizures, a fever over 105 degrees F, or severe redness or swelling where the shot was given  Tell your doctor if you have a history of Guillain-Barré syndrome after you received a vaccine with tetanus  · Call your doctor right away if you faint or have vision changes, numbness or tingling in your arms, hands, or feet, or a seizure after you receive this vaccine  · Tell your doctor if you have an allergy to latex  The syringes may contain dry natural latex rubber  · This vaccine will not treat an active infection  If you have a diphtheria, tetanus, or pertussis infection, you will need medicine to treat the infection    Possible Side Effects While Using This Medicine:   Call your doctor right away if you notice any of these side effects:  · Allergic reaction: Itching or hives, swelling in your face or hands, swelling or tingling in your mouth or throat, chest tightness, trouble breathing  · Changes in vision  · Fever over 105 degrees F  · Lightheadedness or fainting  · Numbness, tingling, or burning pain in your hands, arms, legs, or feet  · Seizures  · Sudden numbness or weakness in your arms or legs  · Severe pain, redness, or swelling where the shot was given  If you notice these less serious side effects, talk with your doctor:   · Headache  · Mild pain, redness, or swelling where the shot was given  · Nausea, vomiting, diarrhea, or stomach pain  · Tiredness  If you notice other side effects that you think are caused by this medicine, tell your doctor  Call your doctor for medical advice about side effects  You may report side effects to FDA at 8-854-FDA-3718  © 2017 [address] [employer] Information is for End User's use only and may not be sold, redistributed or otherwise used for commercial purposes  The above information is an  only  It is not intended as medical advice for individual conditions or treatments  Talk to your doctor, nurse or pharmacist before following any medical regimen to see if it is safe and effective for you

## 2019-01-17 ENCOUNTER — ROUTINE PRENATAL (OUTPATIENT)
Dept: OBGYN CLINIC | Facility: CLINIC | Age: 39
End: 2019-01-17

## 2019-01-17 VITALS — BODY MASS INDEX: 29.89 KG/M2 | WEIGHT: 185.2 LBS | DIASTOLIC BLOOD PRESSURE: 67 MMHG | SYSTOLIC BLOOD PRESSURE: 103 MMHG

## 2019-01-17 DIAGNOSIS — Z3A.18 18 WEEKS GESTATION OF PREGNANCY: Primary | ICD-10-CM

## 2019-01-17 DIAGNOSIS — Z86.718 PRIOR PREGNANCY COMPLICATED BY DVT, ANTEPARTUM: ICD-10-CM

## 2019-01-17 DIAGNOSIS — Z71.6 TOBACCO ABUSE COUNSELING: ICD-10-CM

## 2019-01-17 DIAGNOSIS — R87.613 HIGH GRADE SQUAMOUS INTRAEPITHELIAL CERVICAL DYSPLASIA: ICD-10-CM

## 2019-01-17 DIAGNOSIS — O09.899 PRIOR PREGNANCY COMPLICATED BY DVT, ANTEPARTUM: ICD-10-CM

## 2019-01-17 DIAGNOSIS — F12.90 MARIJUANA USE: ICD-10-CM

## 2019-01-17 DIAGNOSIS — O30.042 DICHORIONIC DIAMNIOTIC TWIN PREGNANCY IN SECOND TRIMESTER: ICD-10-CM

## 2019-01-17 DIAGNOSIS — O09.90 HIGH RISK PREGNANCY, ANTEPARTUM: ICD-10-CM

## 2019-01-17 DIAGNOSIS — Z78.9 NONIMMUNE TO HEPATITIS B VIRUS: ICD-10-CM

## 2019-01-17 DIAGNOSIS — O09.529 ANTEPARTUM MULTIGRAVIDA OF ADVANCED MATERNAL AGE: ICD-10-CM

## 2019-01-17 DIAGNOSIS — Z82.79 FAMILY HISTORY OF CONGENITAL HEART DEFECT: ICD-10-CM

## 2019-01-17 PROBLEM — Z30.09 FAMILY PLANNING: Status: ACTIVE | Noted: 2018-10-17

## 2019-01-17 PROBLEM — O99.330 TOBACCO SMOKING COMPLICATING PREGNANCY: Status: ACTIVE | Noted: 2018-11-29

## 2019-01-17 PROBLEM — N87.0 DYSPLASIA OF CERVIX, LOW GRADE (CIN 1): Status: ACTIVE | Noted: 2017-04-19

## 2019-01-17 PROBLEM — O99.322 DRUG USE AFFECTING PREGNANCY IN SECOND TRIMESTER: Status: ACTIVE | Noted: 2018-11-28

## 2019-01-17 PROBLEM — O34.40 HISTORY OF CERVICAL LEEP BIOPSY AFFECTING CARE OF MOTHER, ANTEPARTUM: Status: ACTIVE | Noted: 2018-11-28

## 2019-01-17 PROBLEM — Z81.8 FAMILY HISTORY OF AUTISM: Status: ACTIVE | Noted: 2018-12-07

## 2019-01-17 PROBLEM — O30.001 TWIN GESTATION IN FIRST TRIMESTER: Status: ACTIVE | Noted: 2018-11-28

## 2019-01-17 PROBLEM — R22.1 MASS OF NECK: Status: ACTIVE | Noted: 2017-03-09

## 2019-01-17 PROBLEM — Z98.890 HISTORY OF CERVICAL LEEP BIOPSY AFFECTING CARE OF MOTHER, ANTEPARTUM: Status: ACTIVE | Noted: 2018-11-28

## 2019-01-17 PROCEDURE — 99202 OFFICE O/P NEW SF 15 MIN: CPT | Performed by: OBSTETRICS & GYNECOLOGY

## 2019-01-17 NOTE — PROGRESS NOTES
Denia Lloyd is a 45 y o   at 19w11d (dating via  tri US with spontaneous di/di twins  She is a transfer of care from [address] [place] [address] [address], she stated she delivered last time with [facility] at [facility] but didn't like her care there, so now she wants to have her twins here where she had her 3 first children  Noemi Walker (M) - 9lb 11oz     - Eugene Viramontes  (F) - 7lb 0z     - Betty Burr (F) - 8lb 7oz     - Murphy (M) - 6lb 8oz      Chief complaint today: none, she would like to meet her doctors today    ROS: VB: denies LOF: denies CXN: denies FM: present    Vitals:    19 0959   BP: 103/67     FHT A 140bpm  FHT B 160bpm    1  Spontaneous dichorionic diamniotic IUPs at 19w11d   - Transfer of care from [address] [place] [address] [address]    - Prenatal labs from [facility] (18)     - 1hr glucola WNL     - GC negative      - PAP NSIL, HPV neg      - Blood: O positive, AB negative     - Hgb 12 4, plt 222     - Hep B Surface Ag negative AND AB negative (nonimmune)     - Hep C AB negative     - HIV negative     - Rubella immune     - Urine Cx neg  2  Hx of IV heroin drug abuse, Hx of crack cocaine use   - UDS pos for [facility] 19   - Stopped heroin and crack in  at [address] pregnant  3  Hx of DVT x2   - DVT  associatd with IVDA and  @ 26w   - Pt declines Lovenox (had declined at [facility]) states she is afraid it will hurt her baby and that "other pregnant women who have twins don't normally take Lovenox, so why would I? The DVT in the past was related to another thing, not pregnancy"   - Dr Pedro Zuleta and I both spoke to the patient, I had previously informed her that twin pregnancy is an even more precarious state and the risk of blood clot, DVT, pulmonary emoblism, pre-eclampsia, hemorrhage,  delivery, and unexpected or poor outcome, not limited to DEATH, is possible  She continues to decline Lovenox and states that she can choose "whatever I want it's my body"   Out of respect for the patient, I agreed that patient autonomy is valuable and agree that as long as she understands the risks and benefits that she may choose what she may like  - Pt had a negative thrombophilia workup at [facility]  4  Hx of LEEP biopsy   -JOHN 1 on 17 PAP   -JOHN 3 on 17 colpo --> LEEP in OR, final result JOHN-1; repeat co-testing in 1 year   - Last PAP at [facility] prenatals; 18 NSIL, HPV neg  5  Advanced maternal age   - S/p genetic consult at [facility]   - Cell free DNA pending ([facility])  6  Congenital branchial cleft cyst   - Workup performed at [facility] in  w recommendations for surgical removal   - Pt did not return for surgery or f/u with ENT specialist   - Imaging from [id number] does not mention defect  7  Vaccinations   - Hep B vaccine indicated (had declined vaccine at [facility] and at this visit)   - Flu vaccine given ([facility])  8  Smoking cessation   - Pt refuses to quit, advised to call 1-800-QUIT-NOW, pt declined nicoderm patch  9  Mild intermittent asthma   - Albuterol PRN  10  Family history of autism   - Son with Asperger syndrome, other affected males in maternal familiy  6  Family history of daugther with "hole" or MVP  12  Contraception   - Desires tubal, CONSENT SIGNED TODAY by Dr Laila Sanders  13  Facial cellulitis 16      D/w and patient seen with Dr Laila Sanders      Future Appointments  Date Time Provider Mariluz Barbosa   2019 1:00 PM  [place] [address] [place] [address]  PGY-2 OB/GYN   2019 10:46 AM

## 2019-01-30 ENCOUNTER — ROUTINE PRENATAL (OUTPATIENT)
Dept: PERINATAL CARE | Facility: CLINIC | Age: 39
End: 2019-01-30
Payer: COMMERCIAL

## 2019-01-30 VITALS
HEART RATE: 88 BPM | HEIGHT: 65 IN | RESPIRATION RATE: 18 BRPM | BODY MASS INDEX: 31.16 KG/M2 | SYSTOLIC BLOOD PRESSURE: 118 MMHG | WEIGHT: 187 LBS | DIASTOLIC BLOOD PRESSURE: 70 MMHG

## 2019-01-30 DIAGNOSIS — Z3A.20 20 WEEKS GESTATION OF PREGNANCY: ICD-10-CM

## 2019-01-30 DIAGNOSIS — Z86.718 HISTORY OF DVT (DEEP VEIN THROMBOSIS): ICD-10-CM

## 2019-01-30 DIAGNOSIS — Z3A.18 18 WEEKS GESTATION OF PREGNANCY: ICD-10-CM

## 2019-01-30 DIAGNOSIS — O99.332 TOBACCO SMOKING AFFECTING PREGNANCY IN SECOND TRIMESTER: ICD-10-CM

## 2019-01-30 DIAGNOSIS — Z36.86 ENCOUNTER FOR ANTENATAL SCREENING FOR CERVICAL LENGTH: ICD-10-CM

## 2019-01-30 DIAGNOSIS — O09.529 ANTEPARTUM MULTIGRAVIDA OF ADVANCED MATERNAL AGE: ICD-10-CM

## 2019-01-30 DIAGNOSIS — O30.042 DICHORIONIC DIAMNIOTIC TWIN PREGNANCY IN SECOND TRIMESTER: Primary | ICD-10-CM

## 2019-01-30 PROCEDURE — 76817 TRANSVAGINAL US OBSTETRIC: CPT | Performed by: OBSTETRICS & GYNECOLOGY

## 2019-01-30 PROCEDURE — 99241 PR OFFICE CONSULTATION NEW/ESTAB PATIENT 15 MIN: CPT | Performed by: OBSTETRICS & GYNECOLOGY

## 2019-01-30 PROCEDURE — 76811 OB US DETAILED SNGL FETUS: CPT | Performed by: OBSTETRICS & GYNECOLOGY

## 2019-01-30 PROCEDURE — 76812 OB US DETAILED ADDL FETUS: CPT | Performed by: OBSTETRICS & GYNECOLOGY

## 2019-02-05 NOTE — PROGRESS NOTES
The patient was seen today for an ultrasound  Please see ultrasound report (located under Ob Procedures) for additional details  Thank you very much for allowing us to participate in the care of this very nice patient  Should you have any questions, please do not hesitate to contact me  Richy Madden MD [address] [facility]  Attending Physician, Ashley

## 2019-02-14 ENCOUNTER — ROUTINE PRENATAL (OUTPATIENT)
Dept: OBGYN CLINIC | Facility: CLINIC | Age: 39
End: 2019-02-14

## 2019-02-14 VITALS — DIASTOLIC BLOOD PRESSURE: 70 MMHG | SYSTOLIC BLOOD PRESSURE: 104 MMHG | WEIGHT: 191.4 LBS | BODY MASS INDEX: 31.85 KG/M2

## 2019-02-14 DIAGNOSIS — Z3A.22 22 WEEKS GESTATION OF PREGNANCY: ICD-10-CM

## 2019-02-14 DIAGNOSIS — Z86.718 PRIOR PREGNANCY COMPLICATED BY DVT, ANTEPARTUM: Primary | ICD-10-CM

## 2019-02-14 DIAGNOSIS — O30.042 DICHORIONIC DIAMNIOTIC TWIN PREGNANCY IN SECOND TRIMESTER: ICD-10-CM

## 2019-02-14 DIAGNOSIS — O09.899 PRIOR PREGNANCY COMPLICATED BY DVT, ANTEPARTUM: Primary | ICD-10-CM

## 2019-02-14 PROCEDURE — 99213 OFFICE O/P EST LOW 20 MIN: CPT | Performed by: OBSTETRICS & GYNECOLOGY

## 2019-02-14 NOTE — ASSESSMENT & PLAN NOTE
- Discussed w/ patient need for coagulation again  She declines Lovenox  Pt reports she started taking low dose aspirin after her appointment at her  center

## 2019-02-14 NOTE — PROGRESS NOTES
Assessment & Plan  45 y o  [id number] at 22w6d presenting for routine prenatal visit  Problem List Items Addressed This Visit        Other    22 weeks gestation of pregnancy     Labor precautions discussed  Has appointment at  center on   RTC in 4 weeks  Prior pregnancy complicated by DVT, antepartum - Primary     - Discussed w/ patient need for coagulation again  She declines Lovenox  Pt reports she started taking low dose aspirin after her appointment at her  center  Dichorionic diamniotic twin pregnancy in second trimester     -  scan: Twin A - 410g, Twin B - 404g on              ____________________________________________________________  Subjective  She is without complaint  She denies contractions, loss of fluid, or vaginal bleeding  She feels regular fetal movements  Reports feeling fatigued and tired at work  She works as a  at [employer] and is on her feet for many hours  Pt asked when she should stop going to work  Discussed with patient that she can have a discussion with her employer regarding leave/maternity leave, but that we usually do not write notes unless there is a medical indication to be out of work  Advised pt to let us know during her appointments how she is feeling and how work is going       Objective  /70   Wt 86 8 kg (191 lb 6 4 oz)   LMP 10/01/2018 (Approximate)   BMI 31 85 kg/m²   FHR: Twin A (vertex) - 134, Twin B (transverse) - 129    Patient's Active Problem List  Patient Active Problem List   Diagnosis    20 weeks gestation of pregnancy    Normal spontaneous vaginal delivery    Asthma    Antepartum multigravida of advanced maternal age   Jessica Pardo Drug use affecting pregnancy in second trimester    Dysplasia of cervix, low grade (JOHN 1)    Facial cellulitis    Family history of autism    Family history of congenital heart defect    Family planning    High grade squamous intraepithelial cervical dysplasia    High risk pregnancy, antepartum    History of cervical LEEP biopsy affecting care of mother, antepartum    History of DVT (deep vein thrombosis)    Mass of neck    Marijuana use    Nonimmune to hepatitis B virus    Prior pregnancy complicated by DVT, antepartum    Tobacco abuse counseling    Tobacco smoking complicating pregnancy    Tooth pain    Dichorionic diamniotic twin pregnancy in second trimester           Marilu Farfan MD  OB/GYN PGY-1  2/14/2019  9:29 AM

## 2019-02-27 ENCOUNTER — ROUTINE PRENATAL (OUTPATIENT)
Dept: OBGYN CLINIC | Facility: CLINIC | Age: 39
End: 2019-02-27

## 2019-02-27 VITALS
BODY MASS INDEX: 31.65 KG/M2 | WEIGHT: 190 LBS | DIASTOLIC BLOOD PRESSURE: 72 MMHG | SYSTOLIC BLOOD PRESSURE: 107 MMHG | HEART RATE: 100 BPM | HEIGHT: 65 IN

## 2019-02-27 DIAGNOSIS — O09.529 ANTEPARTUM MULTIGRAVIDA OF ADVANCED MATERNAL AGE: ICD-10-CM

## 2019-02-27 DIAGNOSIS — Z71.6 TOBACCO ABUSE COUNSELING: ICD-10-CM

## 2019-02-27 DIAGNOSIS — Z86.718 HISTORY OF DVT (DEEP VEIN THROMBOSIS): ICD-10-CM

## 2019-02-27 DIAGNOSIS — N76.0 BV (BACTERIAL VAGINOSIS): ICD-10-CM

## 2019-02-27 DIAGNOSIS — O30.042 DICHORIONIC DIAMNIOTIC TWIN PREGNANCY IN SECOND TRIMESTER: ICD-10-CM

## 2019-02-27 DIAGNOSIS — B96.89 BV (BACTERIAL VAGINOSIS): ICD-10-CM

## 2019-02-27 DIAGNOSIS — Z3A.24 24 WEEKS GESTATION OF PREGNANCY: Primary | ICD-10-CM

## 2019-02-27 PROCEDURE — 99213 OFFICE O/P EST LOW 20 MIN: CPT | Performed by: STUDENT IN AN ORGANIZED HEALTH CARE EDUCATION/TRAINING PROGRAM

## 2019-02-27 RX ORDER — LANSOPRAZOLE 30 MG/1
30 CAPSULE, DELAYED RELEASE ORAL DAILY
Qty: 30 CAPSULE | Refills: 2 | Status: CANCELLED | OUTPATIENT
Start: 2019-02-27

## 2019-02-27 RX ORDER — DOCUSATE SODIUM 100 MG/1
100 CAPSULE, LIQUID FILLED ORAL 2 TIMES DAILY
Qty: 10 CAPSULE | Refills: 0 | Status: SHIPPED | OUTPATIENT
Start: 2019-02-27 | End: 2019-03-28 | Stop reason: ALTCHOICE

## 2019-02-27 RX ORDER — METRONIDAZOLE 500 MG/1
500 TABLET ORAL EVERY 12 HOURS SCHEDULED
Qty: 14 TABLET | Refills: 0 | Status: SHIPPED | OUTPATIENT
Start: 2019-02-27 | End: 2019-03-06

## 2019-02-27 RX ORDER — FAMOTIDINE 20 MG/1
20 TABLET, FILM COATED ORAL 2 TIMES DAILY
Qty: 30 TABLET | Refills: 2 | Status: SHIPPED | OUTPATIENT
Start: 2019-02-27 | End: 2019-05-16 | Stop reason: ALTCHOICE

## 2019-02-27 RX ORDER — FERROUS SULFATE TAB EC 324 MG (65 MG FE EQUIVALENT) 324 (65 FE) MG
324 TABLET DELAYED RESPONSE ORAL
Qty: 30 TABLET | Refills: 2 | Status: ON HOLD | OUTPATIENT
Start: 2019-02-27 | End: 2019-09-13 | Stop reason: ALTCHOICE

## 2019-02-27 NOTE — PROGRESS NOTES
Assessment & Plan  45 y o  [id number] at 24w5d presenting for routine prenatal visit  1  IUP at 24weeks   -continue prenatal vitamins   - appt tomorrow   -28 week lab slip provided   -MARÍA Chu paper signed on 19   -s/p flu vaccination  Declined hep b vaccination  2  Fatigue   -recheck CBC with 28 week lab   -will supplement with iron twice a day   -start colace 100 mg BID  3  Heartburn   -pepcid 20 mg twice   -advised patient to eat smaller meals throughout the day  4  Bacterial vaginosis   -start flagyl 500mg BID x 7 days  5  Hx of DVT   -continue aspirin   -patient refuses any anticoagulation at this time  Aware of risks  6  RTO in 4 weeks     Problem List Items Addressed This Visit        Other    24 weeks gestation of pregnancy - Primary    Relevant Medications    ferrous sulfate 324 (65 Fe) mg    famotidine (PEPCID) 20 mg tablet    docusate sodium (COLACE) 100 mg capsule    Other Relevant Orders    Glucose, 1H PG    CBC and differential    RPR    Antepartum multigravida of advanced maternal age    History of DVT (deep vein thrombosis)    Tobacco abuse counseling    Dichorionic diamniotic twin pregnancy in second trimester      Other Visit Diagnoses     BV (bacterial vaginosis)        Relevant Medications    metroNIDAZOLE (FLAGYL) 500 mg tablet        ____________________________________________________________  Subjective  She complains of worsening heart burn that does not improve with tums  She is also complaining of vaginal itching and pain with sex with her partner  Patient also complains of feeling fatigue and when she went to wic, and per patient, her hemoglobin was 8  She denies contractions, loss of fluid, or vaginal bleeding  She feels regular fetal movements       Objective  /72   Pulse 100   Ht 5' 5" (1 651 m)   Wt 86 2 kg (190 lb)   LMP 10/01/2018 (Approximate)   BMI 31 62 kg/m²     Twin A FHR: 140bpm, vertex  Twin B FH R: 135bpm, transverse    Speculum exam shows whitish wilson discharge   Wet [place]/CHANELLE was positive for clue cells, negative for trich and hyphae  pH > 4 5    Patient's Active Problem List  Patient Active Problem List   Diagnosis    24 weeks gestation of pregnancy    Normal spontaneous vaginal delivery    Asthma    Antepartum multigravida of advanced maternal age   [place] Drug use affecting pregnancy in second trimester    Dysplasia of cervix, low grade (JOHN 1)    Facial cellulitis    Family history of autism    Family history of congenital heart defect    Family planning    High grade squamous intraepithelial cervical dysplasia    High risk pregnancy, antepartum    History of cervical LEEP biopsy affecting care of mother, antepartum    History of DVT (deep vein thrombosis)    Mass of neck    Marijuana use    Nonimmune to hepatitis B virus    Prior pregnancy complicated by DVT, antepartum    Tobacco abuse counseling    Tobacco smoking complicating pregnancy    Tooth pain   [place] Dichorionic diamniotic twin pregnancy in second trimester

## 2019-02-28 ENCOUNTER — ULTRASOUND (OUTPATIENT)
Dept: PERINATAL CARE | Facility: CLINIC | Age: 39
End: 2019-02-28
Payer: COMMERCIAL

## 2019-02-28 VITALS
HEART RATE: 102 BPM | BODY MASS INDEX: 32.72 KG/M2 | SYSTOLIC BLOOD PRESSURE: 96 MMHG | HEIGHT: 65 IN | DIASTOLIC BLOOD PRESSURE: 65 MMHG | WEIGHT: 196.4 LBS

## 2019-02-28 DIAGNOSIS — O09.529 ANTEPARTUM MULTIGRAVIDA OF ADVANCED MATERNAL AGE: ICD-10-CM

## 2019-02-28 DIAGNOSIS — Z71.6 TOBACCO ABUSE COUNSELING: ICD-10-CM

## 2019-02-28 DIAGNOSIS — O99.322 DRUG USE AFFECTING PREGNANCY IN SECOND TRIMESTER: ICD-10-CM

## 2019-02-28 DIAGNOSIS — F12.90 MARIJUANA USE: ICD-10-CM

## 2019-02-28 DIAGNOSIS — O09.90 HIGH RISK PREGNANCY, ANTEPARTUM: ICD-10-CM

## 2019-02-28 DIAGNOSIS — Z3A.24 24 WEEKS GESTATION OF PREGNANCY: Primary | ICD-10-CM

## 2019-02-28 DIAGNOSIS — O30.042 DICHORIONIC DIAMNIOTIC TWIN PREGNANCY IN SECOND TRIMESTER: ICD-10-CM

## 2019-02-28 DIAGNOSIS — K08.89 TOOTH PAIN: ICD-10-CM

## 2019-02-28 PROCEDURE — 76816 OB US FOLLOW-UP PER FETUS: CPT | Performed by: OBSTETRICS & GYNECOLOGY

## 2019-02-28 NOTE — PROGRESS NOTES
Ms Jerry Villalta is a 46 yo  at 25 6/7 weeks gestation with a known Dichorionic Diamniotic Twin pregnancy and she is asymptomatic  1  Live twins, with 3% discordance in fetal weight  2  Normal amniotic fluid in both fetuses  3  Anatomical survey competed in both fetuses and appears to be normal       Recommendations:  1  Follow-up ultrasound in 4 weeks to monitor growth and development which was scheduled today       Charlotte Das MD

## 2019-03-08 ENCOUNTER — APPOINTMENT (OUTPATIENT)
Dept: LAB | Facility: HOSPITAL | Age: 39
End: 2019-03-08
Payer: COMMERCIAL

## 2019-03-08 DIAGNOSIS — Z3A.24 24 WEEKS GESTATION OF PREGNANCY: ICD-10-CM

## 2019-03-08 LAB
BASOPHILS # BLD MANUAL: 0 THOUSAND/UL (ref 0–0.1)
BASOPHILS NFR MAR MANUAL: 0 % (ref 0–1)
EOSINOPHIL # BLD MANUAL: 0.69 THOUSAND/UL (ref 0–0.4)
EOSINOPHIL NFR BLD MANUAL: 4 % (ref 0–6)
ERYTHROCYTE [DISTWIDTH] IN BLOOD BY AUTOMATED COUNT: 13.4 % (ref 11.6–15.1)
GLUCOSE 1H P 50 G GLC PO SERPL-MCNC: 136 MG/DL
HCT VFR BLD AUTO: 32 % (ref 34.8–46.1)
HGB BLD-MCNC: 10.4 G/DL (ref 11.5–15.4)
LYMPHOCYTES # BLD AUTO: 17 % (ref 14–44)
LYMPHOCYTES # BLD AUTO: 2.91 THOUSAND/UL (ref 0.6–4.47)
MCH RBC QN AUTO: 30 PG (ref 26.8–34.3)
MCHC RBC AUTO-ENTMCNC: 32.5 G/DL (ref 31.4–37.4)
MCV RBC AUTO: 92 FL (ref 82–98)
MONOCYTES # BLD AUTO: 0.51 THOUSAND/UL (ref 0–1.22)
MONOCYTES NFR BLD: 3 % (ref 4–12)
NEUTROPHILS # BLD MANUAL: 12.85 THOUSAND/UL (ref 1.85–7.62)
NEUTS SEG NFR BLD AUTO: 75 % (ref 43–75)
NRBC BLD AUTO-RTO: 0 /100 WBCS
PLATELET # BLD AUTO: 224 THOUSANDS/UL (ref 149–390)
PLATELET BLD QL SMEAR: ADEQUATE
PMV BLD AUTO: 11.4 FL (ref 8.9–12.7)
RBC # BLD AUTO: 3.47 MILLION/UL (ref 3.81–5.12)
RBC MORPH BLD: NORMAL
TOTAL CELLS COUNTED SPEC: 100
VARIANT LYMPHS # BLD AUTO: 1 %
WBC # BLD AUTO: 17.13 THOUSAND/UL (ref 4.31–10.16)

## 2019-03-08 PROCEDURE — 85027 COMPLETE CBC AUTOMATED: CPT

## 2019-03-08 PROCEDURE — 36415 COLL VENOUS BLD VENIPUNCTURE: CPT

## 2019-03-08 PROCEDURE — 85007 BL SMEAR W/DIFF WBC COUNT: CPT

## 2019-03-08 PROCEDURE — 82950 GLUCOSE TEST: CPT

## 2019-03-08 PROCEDURE — 86592 SYPHILIS TEST NON-TREP QUAL: CPT

## 2019-03-10 ENCOUNTER — TELEPHONE (OUTPATIENT)
Dept: LABOR AND DELIVERY | Facility: HOSPITAL | Age: 39
End: 2019-03-10

## 2019-03-10 NOTE — TELEPHONE ENCOUNTER
Received a page from patient regarding "sharp pain on her uterus " Upon chart review, patient is a 45yo  at 26w2d with [employer] twin gestation  Called patient back three times within a minute of receiving page  No answer  All calls went to voicemail       Natasha Juarez MD  OBGYN, PGY-2  3/10/2019  6:02 PM

## 2019-03-10 NOTE — TELEPHONE ENCOUNTER
Patient call transferred to resident desk at labor and delivery in [facility]  Patient is currently working her shift at [employer]  She states that she feels intermittent sharp shooting vaginal pain and is unsure whether or not this is the onset of labor  She did not have sharp shooting vaginal pain with her previous pregnancies  She denies contractions, cramping, vaginal bleeding  She reports fetal movement  She reports white vaginal discharge but no malodorous odor  She denies leakage of fluid  She denies dysuria, hematuria, back pain  Discussed with patient that this is likely round ligament pain given her history of sharp shooting pain into her vagina not accompanied by contractions, abdominopelvic pain, or vaginal bleeding  Discussed with her precautions and need for adequate hydration and frequent rests  Advised patient to call if she experiences regular contractions, any pelvic cramping, loss of fluid, vaginal bleeding, or decreased fetal movement  Discussed that this is a high-risk pregnancy for her as she is having twin gestation and that there is increased risk for  delivery  Patient is aware and has contact information in case she needs to call me back      Laila Uriostegui MD  OBGYN, PGY-2  3/10/2019  7:17 PM

## 2019-03-11 LAB — RPR SER QL: NORMAL

## 2019-03-14 DIAGNOSIS — R73.09 ELEVATED GLUCOSE: Primary | ICD-10-CM

## 2019-03-15 ENCOUNTER — TELEPHONE (OUTPATIENT)
Dept: OBGYN CLINIC | Facility: CLINIC | Age: 39
End: 2019-03-15

## 2019-03-15 NOTE — TELEPHONE ENCOUNTER
Spoke to patient on the phone regarding elevated 1 hour glucose  She is to get the 3 hour glucose test done  She must fast 10-12 hours prior to getting it done      CBC showed anemia, she is to continue taking iron pills BID

## 2019-03-21 ENCOUNTER — APPOINTMENT (OUTPATIENT)
Dept: LAB | Facility: HOSPITAL | Age: 39
End: 2019-03-21
Payer: COMMERCIAL

## 2019-03-21 DIAGNOSIS — R73.09 ELEVATED GLUCOSE: ICD-10-CM

## 2019-03-21 LAB
GLUCOSE 1H P 100 G GLC PO SERPL-MCNC: 157 MG/DL (ref 65–179)
GLUCOSE 2H P 100 G GLC PO SERPL-MCNC: 99 MG/DL (ref 65–154)
GLUCOSE 3H P 100 G GLC PO SERPL-MCNC: 60 MG/DL (ref 65–139)
GLUCOSE P FAST SERPL-MCNC: 87 MG/DL (ref 65–99)

## 2019-03-21 PROCEDURE — 36415 COLL VENOUS BLD VENIPUNCTURE: CPT

## 2019-03-21 PROCEDURE — 82952 GTT-ADDED SAMPLES: CPT

## 2019-03-21 PROCEDURE — 82951 GLUCOSE TOLERANCE TEST (GTT): CPT

## 2019-03-27 ENCOUNTER — ROUTINE PRENATAL (OUTPATIENT)
Dept: OBGYN CLINIC | Facility: CLINIC | Age: 39
End: 2019-03-27

## 2019-03-27 VITALS — WEIGHT: 197.2 LBS | BODY MASS INDEX: 32.82 KG/M2 | SYSTOLIC BLOOD PRESSURE: 111 MMHG | DIASTOLIC BLOOD PRESSURE: 65 MMHG

## 2019-03-27 DIAGNOSIS — O30.043 DICHORIONIC DIAMNIOTIC TWIN PREGNANCY IN THIRD TRIMESTER: ICD-10-CM

## 2019-03-27 DIAGNOSIS — R12 HEART BURN: ICD-10-CM

## 2019-03-27 DIAGNOSIS — B37.9 YEAST INFECTION: ICD-10-CM

## 2019-03-27 DIAGNOSIS — O09.90 HIGH RISK PREGNANCY, ANTEPARTUM: ICD-10-CM

## 2019-03-27 DIAGNOSIS — Z3A.28 28 WEEKS GESTATION OF PREGNANCY: ICD-10-CM

## 2019-03-27 DIAGNOSIS — Z34.93 PRENATAL CARE, THIRD TRIMESTER: Primary | ICD-10-CM

## 2019-03-27 LAB
BV WHIFF TEST VAG QL: NEGATIVE
CLUE CELLS SPEC QL WET PREP: NEGATIVE
PH SMN: 4.5 [PH]
SL AMB POCT WET MOUNT: ABNORMAL
TRICHOMONAS GENITAL CULT: NEGATIVE
YEAST VAG QL WET PREP: POSITIVE

## 2019-03-27 PROCEDURE — 99214 OFFICE O/P EST MOD 30 MIN: CPT | Performed by: NURSE PRACTITIONER

## 2019-03-27 PROCEDURE — 87210 SMEAR WET MOUNT SALINE/INK: CPT | Performed by: NURSE PRACTITIONER

## 2019-03-27 PROCEDURE — 90471 IMMUNIZATION ADMIN: CPT

## 2019-03-27 PROCEDURE — 90715 TDAP VACCINE 7 YRS/> IM: CPT

## 2019-03-27 RX ORDER — PANTOPRAZOLE SODIUM 20 MG/1
20 TABLET, DELAYED RELEASE ORAL 2 TIMES DAILY WITH MEALS
Qty: 60 TABLET | Refills: 3 | Status: SHIPPED | OUTPATIENT
Start: 2019-03-27 | End: 2019-05-22 | Stop reason: HOSPADM

## 2019-03-27 NOTE — PROGRESS NOTES
Assessment & Plan  45 y o  [id number] at 28w5d presenting for routine prenatal visit  Pt states heart burn continues and is not relieved with Pepcid  D/W Dr Joaquin Mark switched to Protonix, safe and effective use provided  Continues PNC F/U  WNL 28 labs, had TDAP today  Complained of vaginal discharge and irritation, states she completed Metronidazole for BV    Physical exam  Vulva slight erythema  Vagina positive thick white discharge and erythema  Cervix closed, thick white discharge  Wet mount positive for yeast    Pt states [place] ctx and leg pain occurs when working at [employer], discussed increasing water intake and breaks, pt states that is not possibe and she is considering stopping work  Problem List Items Addressed This Visit        Other    28 weeks gestation of pregnancy    Relevant Orders    TDAP Vaccine greater than or equal to 8yo (Completed)    High risk pregnancy, antepartum    Dichorionic diamniotic twin pregnancy in third trimester      Other Visit Diagnoses     Prenatal care, third trimester    -  Primary    Relevant Orders    TDAP Vaccine greater than or equal to 8yo (Completed)        ____________________________________________________________  Subjective    She denies contractions, loss of fluid, or vaginal bleeding  She feels regular fetal movements       Objective  /65   Wt 89 4 kg (197 lb 3 2 oz)   LMP 10/01/2018 (Approximate)   BMI 32 82 kg/m²   FHR: 130, 150 confirmed by U/S     Patient's Active Problem List  Patient Active Problem List   Diagnosis    28 weeks gestation of pregnancy    Normal spontaneous vaginal delivery    Asthma    Antepartum multigravida of advanced maternal age   [place] Drug use affecting pregnancy in second trimester    Dysplasia of cervix, low grade (JOHN 1)    Facial cellulitis    Family history of autism    Family history of congenital heart defect    Family planning    High grade squamous intraepithelial cervical dysplasia    High risk pregnancy, antepartum    History of cervical LEEP biopsy affecting care of mother, antepartum    History of DVT (deep vein thrombosis)    Mass of neck    Marijuana use    Nonimmune to hepatitis B virus    Prior pregnancy complicated by DVT, antepartum    Tobacco abuse counseling    Tobacco smoking complicating pregnancy    Tooth pain    Dichorionic diamniotic twin pregnancy in third trimester    Elevated glucose     Plan  Use Monistat & as directed  Wear loose clothes and cotton underwear  Take Protonix as directed  [address] follow up  Call with vaginal bleeding, loss of fluid, regular contractions, decreased fetal movement, other needs or concerns  Return in 2 weeks  Pt verbalized understanding of all discussed

## 2019-03-27 NOTE — PATIENT INSTRUCTIONS
Use Monistat & as directed  Wear loose clothes and cotton underwear  Take Protonix as directed  [address] follow up  Call with vaginal bleeding, loss of fluid, regular contractions, decreased fetal movement, other needs or concerns    Return in 2 weeks

## 2019-03-28 ENCOUNTER — ULTRASOUND (OUTPATIENT)
Dept: PERINATAL CARE | Facility: CLINIC | Age: 39
End: 2019-03-28
Payer: COMMERCIAL

## 2019-03-28 VITALS
SYSTOLIC BLOOD PRESSURE: 100 MMHG | DIASTOLIC BLOOD PRESSURE: 67 MMHG | BODY MASS INDEX: 32.99 KG/M2 | WEIGHT: 198 LBS | HEIGHT: 65 IN | HEART RATE: 114 BPM

## 2019-03-28 DIAGNOSIS — Z36.89 ENCOUNTER FOR ULTRASOUND TO CHECK FETAL GROWTH: ICD-10-CM

## 2019-03-28 DIAGNOSIS — Z86.718 HISTORY OF DVT (DEEP VEIN THROMBOSIS): ICD-10-CM

## 2019-03-28 DIAGNOSIS — O09.529 ANTEPARTUM MULTIGRAVIDA OF ADVANCED MATERNAL AGE: ICD-10-CM

## 2019-03-28 DIAGNOSIS — Z3A.28 28 WEEKS GESTATION OF PREGNANCY: ICD-10-CM

## 2019-03-28 DIAGNOSIS — O30.043 DICHORIONIC DIAMNIOTIC TWIN PREGNANCY IN THIRD TRIMESTER: Primary | ICD-10-CM

## 2019-03-28 PROCEDURE — 76816 OB US FOLLOW-UP PER FETUS: CPT | Performed by: OBSTETRICS & GYNECOLOGY

## 2019-03-28 PROCEDURE — 99214 OFFICE O/P EST MOD 30 MIN: CPT | Performed by: OBSTETRICS & GYNECOLOGY

## 2019-03-28 NOTE — PATIENT INSTRUCTIONS
Thank you for choosing [address] [facility] for your  care today  If you have any questions about your ultrasound or care, please do not hesitate to contact us or your primary obstetrician  Deep Venous Thrombosis   WHAT YOU NEED TO KNOW:   What is deep venous thrombosis? Deep venous thrombosis (DVT) is a blood clot that forms in a deep vein of the body  The deep veins in the legs, thighs, and hips are the most common sites for DVT  DVT can also occur in a deep vein within your arms  The clot prevents the normal flow of blood in the vein  The blood backs up and causes pain and swelling  The DVT can break into smaller pieces and travel to your lungs and cause a blockage called a pulmonary embolism  A pulmonary embolism can become life-threatening  What increases my risk for DVT? You may be at higher risk if you have had DVT before or you have a family history of blood clots  The following conditions also increase your risk:  · Age older than 60 years    · Obesity    · Injury to a deep vein, or surgery    · Use of hormone replacement therapy or birth control medicine such as pills or patches    · Pregnancy, and up to 6 weeks after childbirth     · A blood disorder that makes your blood clot faster than normal, such as factor V Leiden mutation    · Cancer or heart failure     · Limited mobility caused by bed rest, a leg cast, or sitting for long periods    · Varicose veins    · Catheter placed in a large vein  What are the signs and symptoms of DVT? · Swelling    · Redness    · Warmth, pain, or tenderness  How is DVT diagnosed? · A D-dimer blood test  may be done to check for signs of a blood clot  · An ultrasound  uses sound waves to show pictures on a monitor  An ultrasound may be done to show a clot in your vein  · Contrast venography  is an x-ray of a vein  Contrast liquid is used to make the vein easier to see on the x-ray   Tell a healthcare provider if you have ever had an allergic reaction to contrast liquid  How is DVT treated? · Blood thinners  help prevent the DVT from getting bigger and prevent new clots from forming  Examples of blood thinners include heparin, rivaroxaban, apixiban, and warfarin  The following are general safety guidelines to follow while you are taking a blood thinner:     ¨ Watch for bleeding and bruising  Watch for bleeding from your gums or nose  Watch for blood in your urine and bowel movements  Use a soft washcloth on your skin, and a soft toothbrush to brush your teeth  This can keep your skin and gums from bleeding  If you shave, use an electric shaver  Do not play contact sports  ¨ Tell your dentist and other healthcare providers that you take a blood thinner  Wear a bracelet or necklace that says you take this medicine  ¨ Do not start or stop any medicines unless your healthcare provider tells you to  Many medicines cannot be used with blood thinners  ¨ Tell your healthcare provider right away if you forget to take the blood thinner , or if you take too much  ¨ Warfarin  is a blood thinner that you may need to take  The following are additional things you should be aware of if you take warfarin:    § Foods and medicines can affect the amount of warfarin in your blood  Do not make major changes to your diet  Warfarin works best when you eat about the same amount of vitamin K every day  Vitamin K is found in green leafy vegetables and certain other foods  Ask for more information about what to eat or not to eat  § You will need to see your healthcare provider for follow-up visits  You will need regular blood tests to decide how much warfarin you need  · Clot busters  are emergency medicines that work to dissolve blood clots  They cannot be used during pregnancy or in people with medical conditions that increase their risk of bleeding  · A vena cava filter  may be placed inside your vena cava to treat your DVT  The vena cava is a large vein that brings blood from your lower body up to your heart  The filter traps blood clots and prevents them from going into your lungs  · Surgery , called a thrombectomy, may be done to remove the clot  A procedure called thrombolysis may instead be done to inject a clot buster that helps break the clot apart  How can I manage my DVT? · Wear pressure stockings  The stockings are tight and put pressure on your legs  This improves blood flow and helps prevent clots  Wear the stockings during the day  Do not wear them when you sleep  · Elevate your legs  above the level of your heart as often as you can  This will help decrease swelling and pain  Prop your legs on pillows or blankets to keep them elevated comfortably  · Exercise regularly  to help increase your blood flow  Walking is a good low-impact exercise  Talk to your healthcare provider about the best exercise plan for you  · Change body positions often  If you travel by car or work at a desk, move and stretch in your seat several times each hour  In an airplane, get up and walk every hour  If you are bedridden, ask for help to change your position every 1 to 2 hours  · Maintain a healthy weight  Ask your healthcare provider how much you should weigh  Ask him to help you create a weight loss plan if you are overweight  · Do not smoke  Nicotine and other chemicals in cigarettes and cigars can damage blood vessels and make it more difficult to manage your DVT  Ask your healthcare provider for information if you currently smoke and need help to quit  E-cigarettes or smokeless tobacco still contain nicotine  Talk to your healthcare provider before you use these products  Call 911 for the following:   · You feel lightheaded, short of breath, and have chest pain  · You cough up blood  When should I seek immediate care? · You develop new DVT symptoms in another leg or arm       When should I contact my healthcare provider? · Your gums or nose bleed  · You see blood in your urine or bowel movements  · Your bowel movements are black or darker than normal     · You have questions or concerns about your conditions or care  CARE AGREEMENT:   You have the right to help plan your care  Learn about your health condition and how it may be treated  Discuss treatment options with your caregivers to decide what care you want to receive  You always have the right to refuse treatment  The above information is an  only  It is not intended as medical advice for individual conditions or treatments  Talk to your doctor, nurse or pharmacist before following any medical regimen to see if it is safe and effective for you  © 2017 [facility] Information is for End User's use only and may not be sold, redistributed or otherwise used for commercial purposes  All illustrations and images included in CareNotes® are the copyrighted property of A D A M , Inc  or Fredy Willis

## 2019-03-28 NOTE — PROGRESS NOTES
[address]: Ms Nilson Donaldson was seen today at 28w6d for twin fetal growth assessment ultrasound  See ultrasound report under "OB Procedures" tab  Please don't hesitate to contact our office with any concerns or questions    Teri Baum MD

## 2019-04-09 ENCOUNTER — ROUTINE PRENATAL (OUTPATIENT)
Dept: OBGYN CLINIC | Facility: CLINIC | Age: 39
End: 2019-04-09

## 2019-04-09 VITALS
HEART RATE: 88 BPM | DIASTOLIC BLOOD PRESSURE: 71 MMHG | SYSTOLIC BLOOD PRESSURE: 109 MMHG | BODY MASS INDEX: 32.52 KG/M2 | WEIGHT: 195.4 LBS

## 2019-04-09 DIAGNOSIS — Z3A.30 30 WEEKS GESTATION OF PREGNANCY: Primary | ICD-10-CM

## 2019-04-09 PROCEDURE — 99213 OFFICE O/P EST LOW 20 MIN: CPT | Performed by: STUDENT IN AN ORGANIZED HEALTH CARE EDUCATION/TRAINING PROGRAM

## 2019-04-11 ENCOUNTER — ROUTINE PRENATAL (OUTPATIENT)
Dept: OBGYN CLINIC | Facility: CLINIC | Age: 39
End: 2019-04-11

## 2019-04-11 VITALS — DIASTOLIC BLOOD PRESSURE: 69 MMHG | WEIGHT: 196.6 LBS | BODY MASS INDEX: 32.72 KG/M2 | SYSTOLIC BLOOD PRESSURE: 106 MMHG

## 2019-04-11 DIAGNOSIS — O30.043 DICHORIONIC DIAMNIOTIC TWIN PREGNANCY IN THIRD TRIMESTER: ICD-10-CM

## 2019-04-11 DIAGNOSIS — Z3A.30 30 WEEKS GESTATION OF PREGNANCY: ICD-10-CM

## 2019-04-11 PROCEDURE — 99213 OFFICE O/P EST LOW 20 MIN: CPT | Performed by: OBSTETRICS & GYNECOLOGY

## 2019-04-15 ENCOUNTER — TELEPHONE (OUTPATIENT)
Dept: LABOR AND DELIVERY | Facility: HOSPITAL | Age: 39
End: 2019-04-15

## 2019-04-18 DIAGNOSIS — Z34.93 PRENATAL CARE IN THIRD TRIMESTER: Primary | ICD-10-CM

## 2019-04-18 RX ORDER — LANOLIN ALCOHOL/MO/W.PET/CERES
400 CREAM (GRAM) TOPICAL DAILY
Qty: 30 TABLET | Refills: 2 | Status: SHIPPED | OUTPATIENT
Start: 2019-04-18 | End: 2019-05-22 | Stop reason: HOSPADM

## 2019-04-19 ENCOUNTER — HOSPITAL ENCOUNTER (OUTPATIENT)
Facility: HOSPITAL | Age: 39
Discharge: HOME | End: 2019-04-19
Attending: OBSTETRICS & GYNECOLOGY | Admitting: OBSTETRICS & GYNECOLOGY
Payer: COMMERCIAL

## 2019-04-19 ENCOUNTER — TELEPHONE (OUTPATIENT)
Dept: OBGYN CLINIC | Facility: CLINIC | Age: 39
End: 2019-04-19

## 2019-04-19 VITALS
SYSTOLIC BLOOD PRESSURE: 110 MMHG | TEMPERATURE: 98.1 F | DIASTOLIC BLOOD PRESSURE: 62 MMHG | OXYGEN SATURATION: 97 % | RESPIRATION RATE: 18 BRPM | HEART RATE: 108 BPM

## 2019-04-19 LAB
BACTERIA UR QL AUTO: ABNORMAL /HPF
BILIRUB UR QL STRIP: NEGATIVE
CLARITY UR: CLEAR
COLOR UR: YELLOW
GLUCOSE UR STRIP-MCNC: NEGATIVE MG/DL
HGB UR QL STRIP.AUTO: ABNORMAL
KETONES UR STRIP-MCNC: NEGATIVE MG/DL
LEUKOCYTE ESTERASE UR QL STRIP: NEGATIVE
NITRITE UR QL STRIP: NEGATIVE
NON-SQ EPI CELLS URNS QL MICRO: ABNORMAL /HPF
PH UR STRIP.AUTO: 8 [PH]
PROT UR STRIP-MCNC: NEGATIVE MG/DL
RBC #/AREA URNS AUTO: ABNORMAL /HPF
SP GR UR STRIP.AUTO: 1.01 (ref 1–1.03)
UROBILINOGEN UR QL STRIP.AUTO: 0.2 E.U./DL
WBC #/AREA URNS AUTO: ABNORMAL /HPF

## 2019-04-19 PROCEDURE — 76817 TRANSVAGINAL US OBSTETRIC: CPT | Performed by: STUDENT IN AN ORGANIZED HEALTH CARE EDUCATION/TRAINING PROGRAM

## 2019-04-19 PROCEDURE — NC001 PR NO CHARGE: Performed by: OBSTETRICS & GYNECOLOGY

## 2019-04-19 PROCEDURE — 81001 URINALYSIS AUTO W/SCOPE: CPT | Performed by: FAMILY MEDICINE

## 2019-04-19 PROCEDURE — 93005 ELECTROCARDIOGRAM TRACING: CPT

## 2019-04-19 PROCEDURE — 99204 OFFICE O/P NEW MOD 45 MIN: CPT

## 2019-04-19 PROCEDURE — NC001 PR NO CHARGE: Performed by: FAMILY MEDICINE

## 2019-04-19 PROCEDURE — 87086 URINE CULTURE/COLONY COUNT: CPT | Performed by: FAMILY MEDICINE

## 2019-04-19 RX ORDER — BETAMETHASONE SODIUM PHOSPHATE AND BETAMETHASONE ACETATE 3; 3 MG/ML; MG/ML
12 INJECTION, SUSPENSION INTRA-ARTICULAR; INTRALESIONAL; INTRAMUSCULAR; SOFT TISSUE ONCE
Status: DISCONTINUED | OUTPATIENT
Start: 2019-04-19 | End: 2019-04-19 | Stop reason: HOSPADM

## 2019-04-19 RX ADMIN — SODIUM CHLORIDE, SODIUM LACTATE, POTASSIUM CHLORIDE, AND CALCIUM CHLORIDE 1000 ML: .6; .31; .03; .02 INJECTION, SOLUTION INTRAVENOUS at 16:00

## 2019-04-20 ENCOUNTER — HOSPITAL ENCOUNTER (OUTPATIENT)
Facility: HOSPITAL | Age: 39
Discharge: HOME | End: 2019-04-20
Attending: OBSTETRICS & GYNECOLOGY | Admitting: OBSTETRICS & GYNECOLOGY
Payer: COMMERCIAL

## 2019-04-20 LAB
ATRIAL RATE: 99 BPM
BACTERIA UR CULT: NORMAL
P AXIS: 45 DEGREES
PR INTERVAL: 136 MS
QRS AXIS: 35 DEGREES
QRSD INTERVAL: 86 MS
QT INTERVAL: 334 MS
QTC INTERVAL: 428 MS
T WAVE AXIS: 32 DEGREES
VENTRICULAR RATE: 99 BPM

## 2019-04-20 PROCEDURE — 93010 ELECTROCARDIOGRAM REPORT: CPT | Performed by: INTERNAL MEDICINE

## 2019-04-20 PROCEDURE — 96372 THER/PROPH/DIAG INJ SC/IM: CPT

## 2019-04-20 RX ORDER — BETAMETHASONE SODIUM PHOSPHATE AND BETAMETHASONE ACETATE 3; 3 MG/ML; MG/ML
12 INJECTION, SUSPENSION INTRA-ARTICULAR; INTRALESIONAL; INTRAMUSCULAR; SOFT TISSUE ONCE
Status: COMPLETED | OUTPATIENT
Start: 2019-04-20 | End: 2019-04-20

## 2019-04-20 RX ADMIN — BETAMETHASONE SODIUM PHOSPHATE AND BETAMETHASONE ACETATE 12 MG: 3; 3 INJECTION, SUSPENSION INTRA-ARTICULAR; INTRALESIONAL; INTRAMUSCULAR at 18:21

## 2019-04-23 ENCOUNTER — ROUTINE PRENATAL (OUTPATIENT)
Dept: OBGYN CLINIC | Facility: CLINIC | Age: 39
End: 2019-04-23

## 2019-04-23 VITALS
HEIGHT: 65 IN | WEIGHT: 199 LBS | SYSTOLIC BLOOD PRESSURE: 119 MMHG | BODY MASS INDEX: 33.15 KG/M2 | DIASTOLIC BLOOD PRESSURE: 77 MMHG | HEART RATE: 100 BPM

## 2019-04-23 DIAGNOSIS — Z30.09 FAMILY PLANNING: Primary | ICD-10-CM

## 2019-04-23 DIAGNOSIS — O30.043 DICHORIONIC DIAMNIOTIC TWIN PREGNANCY IN THIRD TRIMESTER: ICD-10-CM

## 2019-04-23 DIAGNOSIS — Z3A.32 32 WEEKS GESTATION OF PREGNANCY: ICD-10-CM

## 2019-04-23 PROCEDURE — 99213 OFFICE O/P EST LOW 20 MIN: CPT | Performed by: OBSTETRICS & GYNECOLOGY

## 2019-04-27 DIAGNOSIS — K59.00 CONSTIPATION: Primary | ICD-10-CM

## 2019-04-27 RX ORDER — DOCUSATE SODIUM 100 MG/1
100 CAPSULE, LIQUID FILLED ORAL 2 TIMES DAILY
Qty: 10 CAPSULE | Refills: 0 | Status: SHIPPED | OUTPATIENT
Start: 2019-04-27 | End: 2019-05-16 | Stop reason: ALTCHOICE

## 2019-04-30 ENCOUNTER — ULTRASOUND (OUTPATIENT)
Dept: PERINATAL CARE | Facility: CLINIC | Age: 39
End: 2019-04-30
Payer: COMMERCIAL

## 2019-04-30 VITALS
HEART RATE: 114 BPM | BODY MASS INDEX: 32.76 KG/M2 | SYSTOLIC BLOOD PRESSURE: 102 MMHG | WEIGHT: 196.6 LBS | DIASTOLIC BLOOD PRESSURE: 68 MMHG | HEIGHT: 65 IN

## 2019-04-30 DIAGNOSIS — O09.529 ANTEPARTUM MULTIGRAVIDA OF ADVANCED MATERNAL AGE: ICD-10-CM

## 2019-04-30 DIAGNOSIS — Z3A.33 33 WEEKS GESTATION OF PREGNANCY: ICD-10-CM

## 2019-04-30 DIAGNOSIS — O30.043 DICHORIONIC DIAMNIOTIC TWIN PREGNANCY IN THIRD TRIMESTER: Primary | ICD-10-CM

## 2019-04-30 DIAGNOSIS — Z3A.32 32 WEEKS GESTATION OF PREGNANCY: ICD-10-CM

## 2019-04-30 PROCEDURE — 76818 FETAL BIOPHYS PROFILE W/NST: CPT | Performed by: OBSTETRICS & GYNECOLOGY

## 2019-04-30 PROCEDURE — 76816 OB US FOLLOW-UP PER FETUS: CPT | Performed by: OBSTETRICS & GYNECOLOGY

## 2019-04-30 PROCEDURE — 99212 OFFICE O/P EST SF 10 MIN: CPT | Performed by: OBSTETRICS & GYNECOLOGY

## 2019-05-09 ENCOUNTER — ROUTINE PRENATAL (OUTPATIENT)
Dept: OBGYN CLINIC | Facility: CLINIC | Age: 39
End: 2019-05-09

## 2019-05-09 ENCOUNTER — ULTRASOUND (OUTPATIENT)
Dept: PERINATAL CARE | Facility: CLINIC | Age: 39
End: 2019-05-09
Payer: COMMERCIAL

## 2019-05-09 VITALS
DIASTOLIC BLOOD PRESSURE: 75 MMHG | WEIGHT: 199 LBS | HEIGHT: 65 IN | HEART RATE: 102 BPM | BODY MASS INDEX: 33.15 KG/M2 | SYSTOLIC BLOOD PRESSURE: 109 MMHG

## 2019-05-09 VITALS
SYSTOLIC BLOOD PRESSURE: 115 MMHG | DIASTOLIC BLOOD PRESSURE: 77 MMHG | HEART RATE: 98 BPM | BODY MASS INDEX: 32.75 KG/M2 | WEIGHT: 196.8 LBS

## 2019-05-09 DIAGNOSIS — O09.529 ANTEPARTUM MULTIGRAVIDA OF ADVANCED MATERNAL AGE: ICD-10-CM

## 2019-05-09 DIAGNOSIS — Z3A.34 34 WEEKS GESTATION OF PREGNANCY: Primary | ICD-10-CM

## 2019-05-09 DIAGNOSIS — O30.043 DICHORIONIC DIAMNIOTIC TWIN PREGNANCY IN THIRD TRIMESTER: ICD-10-CM

## 2019-05-09 DIAGNOSIS — O30.043 DICHORIONIC DIAMNIOTIC TWIN PREGNANCY IN THIRD TRIMESTER: Primary | ICD-10-CM

## 2019-05-09 PROBLEM — B37.9 YEAST INFECTION: Status: RESOLVED | Noted: 2019-03-27 | Resolved: 2019-05-09

## 2019-05-09 PROCEDURE — 76818 FETAL BIOPHYS PROFILE W/NST: CPT | Performed by: OBSTETRICS & GYNECOLOGY

## 2019-05-09 PROCEDURE — 99215 OFFICE O/P EST HI 40 MIN: CPT | Performed by: NURSE PRACTITIONER

## 2019-05-16 ENCOUNTER — APPOINTMENT (OUTPATIENT)
Dept: LAB | Facility: HOSPITAL | Age: 39
End: 2019-05-16
Attending: OBSTETRICS & GYNECOLOGY
Payer: COMMERCIAL

## 2019-05-16 ENCOUNTER — ULTRASOUND (OUTPATIENT)
Dept: PERINATAL CARE | Facility: CLINIC | Age: 39
End: 2019-05-16
Payer: COMMERCIAL

## 2019-05-16 ENCOUNTER — ROUTINE PRENATAL (OUTPATIENT)
Dept: OBGYN CLINIC | Facility: CLINIC | Age: 39
End: 2019-05-16

## 2019-05-16 ENCOUNTER — HOSPITAL ENCOUNTER (OUTPATIENT)
Facility: HOSPITAL | Age: 39
Discharge: HOME | End: 2019-05-16
Attending: OBSTETRICS & GYNECOLOGY | Admitting: OBSTETRICS & GYNECOLOGY
Payer: COMMERCIAL

## 2019-05-16 VITALS
TEMPERATURE: 98.2 F | HEIGHT: 65 IN | RESPIRATION RATE: 18 BRPM | WEIGHT: 202 LBS | HEART RATE: 127 BPM | SYSTOLIC BLOOD PRESSURE: 100 MMHG | BODY MASS INDEX: 33.66 KG/M2 | DIASTOLIC BLOOD PRESSURE: 68 MMHG

## 2019-05-16 VITALS
HEIGHT: 65 IN | WEIGHT: 202 LBS | HEART RATE: 112 BPM | DIASTOLIC BLOOD PRESSURE: 75 MMHG | BODY MASS INDEX: 33.66 KG/M2 | SYSTOLIC BLOOD PRESSURE: 107 MMHG

## 2019-05-16 VITALS
WEIGHT: 197.4 LBS | DIASTOLIC BLOOD PRESSURE: 81 MMHG | BODY MASS INDEX: 32.85 KG/M2 | SYSTOLIC BLOOD PRESSURE: 112 MMHG | HEART RATE: 98 BPM

## 2019-05-16 DIAGNOSIS — Z34.93 PRENATAL CARE IN THIRD TRIMESTER: ICD-10-CM

## 2019-05-16 DIAGNOSIS — O30.043 DICHORIONIC DIAMNIOTIC TWIN PREGNANCY IN THIRD TRIMESTER: Primary | ICD-10-CM

## 2019-05-16 DIAGNOSIS — O30.043 DICHORIONIC DIAMNIOTIC TWIN PREGNANCY IN THIRD TRIMESTER: ICD-10-CM

## 2019-05-16 DIAGNOSIS — Z3A.35 35 WEEKS GESTATION OF PREGNANCY: Primary | ICD-10-CM

## 2019-05-16 DIAGNOSIS — K21.9 GASTROESOPHAGEAL REFLUX DISEASE WITHOUT ESOPHAGITIS: ICD-10-CM

## 2019-05-16 DIAGNOSIS — Z3A.24 24 WEEKS GESTATION OF PREGNANCY: ICD-10-CM

## 2019-05-16 DIAGNOSIS — Z3A.35 35 WEEKS GESTATION OF PREGNANCY: ICD-10-CM

## 2019-05-16 DIAGNOSIS — R74.8 ELEVATED LIVER ENZYMES: ICD-10-CM

## 2019-05-16 LAB
ALBUMIN SERPL BCP-MCNC: 2.4 G/DL (ref 3.5–5)
ALP SERPL-CCNC: 591 U/L (ref 46–116)
ALT SERPL W P-5'-P-CCNC: 126 U/L (ref 12–78)
ANION GAP SERPL CALCULATED.3IONS-SCNC: 7 MMOL/L (ref 4–13)
AST SERPL W P-5'-P-CCNC: 116 U/L (ref 5–45)
BILIRUB SERPL-MCNC: 0.3 MG/DL (ref 0.2–1)
BUN SERPL-MCNC: 7 MG/DL (ref 5–25)
CA-I BLD-SCNC: 1.13 MMOL/L (ref 1.12–1.32)
CALCIUM SERPL-MCNC: 8.7 MG/DL (ref 8.3–10.1)
CHLORIDE SERPL-SCNC: 106 MMOL/L (ref 100–108)
CO2 SERPL-SCNC: 22 MMOL/L (ref 21–32)
CREAT SERPL-MCNC: 0.68 MG/DL (ref 0.6–1.3)
ERYTHROCYTE [DISTWIDTH] IN BLOOD BY AUTOMATED COUNT: 13.2 % (ref 11.6–15.1)
GFR SERPL CREATININE-BSD FRML MDRD: 111 ML/MIN/1.73SQ M
GLUCOSE SERPL-MCNC: 93 MG/DL (ref 65–140)
HCT VFR BLD AUTO: 34.8 % (ref 34.8–46.1)
HGB BLD-MCNC: 11.8 G/DL (ref 11.5–15.4)
LIPASE SERPL-CCNC: 98 U/L (ref 73–393)
MCH RBC QN AUTO: 29.7 PG (ref 26.8–34.3)
MCHC RBC AUTO-ENTMCNC: 33.9 G/DL (ref 31.4–37.4)
MCV RBC AUTO: 88 FL (ref 82–98)
PLATELET # BLD AUTO: 206 THOUSANDS/UL (ref 149–390)
PMV BLD AUTO: 12.8 FL (ref 8.9–12.7)
POTASSIUM SERPL-SCNC: 4 MMOL/L (ref 3.5–5.3)
PROT SERPL-MCNC: 6.7 G/DL (ref 6.4–8.2)
RBC # BLD AUTO: 3.97 MILLION/UL (ref 3.81–5.12)
SODIUM SERPL-SCNC: 135 MMOL/L (ref 136–145)
URATE SERPL-MCNC: 7.1 MG/DL (ref 2–6.8)
WBC # BLD AUTO: 12.62 THOUSAND/UL (ref 4.31–10.16)

## 2019-05-16 PROCEDURE — 85027 COMPLETE CBC AUTOMATED: CPT

## 2019-05-16 PROCEDURE — 80074 ACUTE HEPATITIS PANEL: CPT | Performed by: STUDENT IN AN ORGANIZED HEALTH CARE EDUCATION/TRAINING PROGRAM

## 2019-05-16 PROCEDURE — 82330 ASSAY OF CALCIUM: CPT

## 2019-05-16 PROCEDURE — 99203 OFFICE O/P NEW LOW 30 MIN: CPT

## 2019-05-16 PROCEDURE — 83690 ASSAY OF LIPASE: CPT | Performed by: STUDENT IN AN ORGANIZED HEALTH CARE EDUCATION/TRAINING PROGRAM

## 2019-05-16 PROCEDURE — 76815 OB US LIMITED FETUS(S): CPT | Performed by: OBSTETRICS & GYNECOLOGY

## 2019-05-16 PROCEDURE — 59025 FETAL NON-STRESS TEST: CPT | Performed by: OBSTETRICS & GYNECOLOGY

## 2019-05-16 PROCEDURE — 99215 OFFICE O/P EST HI 40 MIN: CPT | Performed by: NURSE PRACTITIONER

## 2019-05-16 PROCEDURE — 99213 OFFICE O/P EST LOW 20 MIN: CPT | Performed by: OBSTETRICS & GYNECOLOGY

## 2019-05-16 PROCEDURE — 36415 COLL VENOUS BLD VENIPUNCTURE: CPT

## 2019-05-16 PROCEDURE — 87653 STREP B DNA AMP PROBE: CPT | Performed by: NURSE PRACTITIONER

## 2019-05-16 PROCEDURE — 80053 COMPREHEN METABOLIC PANEL: CPT

## 2019-05-16 PROCEDURE — 84550 ASSAY OF BLOOD/URIC ACID: CPT

## 2019-05-16 PROCEDURE — 82240 BILE ACIDS CHOLYLGLYCINE: CPT | Performed by: STUDENT IN AN ORGANIZED HEALTH CARE EDUCATION/TRAINING PROGRAM

## 2019-05-16 RX ORDER — RANITIDINE HCL 75 MG
75 TABLET ORAL 2 TIMES DAILY
Qty: 30 TABLET | Refills: 2 | Status: SHIPPED | OUTPATIENT
Start: 2019-05-16 | End: 2019-05-22 | Stop reason: HOSPADM

## 2019-05-17 LAB
HAV IGM SER QL: NORMAL
HBV CORE IGM SER QL: NORMAL
HBV SURFACE AG SER QL: NORMAL
HCV AB SER QL: NORMAL

## 2019-05-19 ENCOUNTER — HOSPITAL ENCOUNTER (INPATIENT)
Facility: HOSPITAL | Age: 39
LOS: 2 days | Discharge: HOME | DRG: 560 | End: 2019-05-22
Attending: OBSTETRICS & GYNECOLOGY | Admitting: OBSTETRICS & GYNECOLOGY
Payer: COMMERCIAL

## 2019-05-19 DIAGNOSIS — O09.899 PREVIOUS DEEP VEIN THROMBOSIS (DVT) AFFECTING PREGNANCY: ICD-10-CM

## 2019-05-19 DIAGNOSIS — Z86.718 HISTORY OF DVT (DEEP VEIN THROMBOSIS): ICD-10-CM

## 2019-05-19 DIAGNOSIS — Z87.19 HISTORY OF CHOLESTASIS DURING PREGNANCY: ICD-10-CM

## 2019-05-19 DIAGNOSIS — Z87.59 HISTORY OF CHOLESTASIS DURING PREGNANCY: ICD-10-CM

## 2019-05-19 DIAGNOSIS — Z86.718 PREVIOUS DEEP VEIN THROMBOSIS (DVT) AFFECTING PREGNANCY: ICD-10-CM

## 2019-05-19 LAB — GP B STREP DNA SPEC QL NAA+PROBE: NORMAL

## 2019-05-19 PROCEDURE — 3E033VJ INTRODUCTION OF OTHER HORMONE INTO PERIPHERAL VEIN, PERCUTANEOUS APPROACH: ICD-10-PCS | Performed by: OBSTETRICS & GYNECOLOGY

## 2019-05-19 PROCEDURE — 4A1HXCZ MONITORING OF PRODUCTS OF CONCEPTION, CARDIAC RATE, EXTERNAL APPROACH: ICD-10-PCS | Performed by: OBSTETRICS & GYNECOLOGY

## 2019-05-20 ENCOUNTER — ANESTHESIA EVENT (INPATIENT)
Dept: ANESTHESIOLOGY | Facility: HOSPITAL | Age: 39
DRG: 560 | End: 2019-05-20
Payer: COMMERCIAL

## 2019-05-20 ENCOUNTER — ANESTHESIA (INPATIENT)
Dept: ANESTHESIOLOGY | Facility: HOSPITAL | Age: 39
DRG: 560 | End: 2019-05-20
Payer: COMMERCIAL

## 2019-05-20 PROBLEM — Z87.442 HISTORY OF NEPHROLITHIASIS: Status: ACTIVE | Noted: 2019-05-20

## 2019-05-20 LAB
ALBUMIN SERPL BCP-MCNC: 2.2 G/DL (ref 3.5–5)
ALP SERPL-CCNC: 593 U/L (ref 46–116)
ALT SERPL W P-5'-P-CCNC: 134 U/L (ref 12–78)
AMPHETAMINES SERPL QL SCN: NEGATIVE
ANION GAP SERPL CALCULATED.3IONS-SCNC: 10 MMOL/L (ref 4–13)
AST SERPL W P-5'-P-CCNC: 107 U/L (ref 5–45)
BACTERIA UR QL AUTO: NORMAL /HPF
BARBITURATES UR QL: NEGATIVE
BASE EXCESS BLDCOA CALC-SCNC: -5 MMOL/L (ref 3–11)
BASE EXCESS BLDCOA CALC-SCNC: -7.7 MMOL/L (ref 3–11)
BASE EXCESS BLDCOV CALC-SCNC: -2.2 MMOL/L (ref 1–9)
BASE EXCESS BLDCOV CALC-SCNC: -4 MMOL/L (ref 1–9)
BASOPHILS # BLD AUTO: 0.17 THOUSANDS/ΜL (ref 0–0.1)
BASOPHILS NFR BLD AUTO: 1 % (ref 0–1)
BENZODIAZ UR QL: NEGATIVE
BILIRUB SERPL-MCNC: 0.2 MG/DL (ref 0.2–1)
BILIRUB UR QL STRIP: NEGATIVE
BUN SERPL-MCNC: 6 MG/DL (ref 5–25)
CALCIUM SERPL-MCNC: 9.8 MG/DL (ref 8.3–10.1)
CHLORIDE SERPL-SCNC: 104 MMOL/L (ref 100–108)
CLARITY UR: NORMAL
CO2 SERPL-SCNC: 22 MMOL/L (ref 21–32)
COCAINE UR QL: NEGATIVE
COLOR UR: YELLOW
CREAT SERPL-MCNC: 0.72 MG/DL (ref 0.6–1.3)
EOSINOPHIL # BLD AUTO: 1 THOUSAND/ΜL (ref 0–0.61)
EOSINOPHIL NFR BLD AUTO: 7 % (ref 0–6)
ERYTHROCYTE [DISTWIDTH] IN BLOOD BY AUTOMATED COUNT: 13.2 % (ref 11.6–15.1)
GFR SERPL CREATININE-BSD FRML MDRD: 107 ML/MIN/1.73SQ M
GLUCOSE SERPL-MCNC: 86 MG/DL (ref 65–140)
GLUCOSE UR STRIP-MCNC: NEGATIVE MG/DL
HCO3 BLDCOA-SCNC: 20 MMOL/L (ref 17.3–27.3)
HCO3 BLDCOA-SCNC: 21.1 MMOL/L (ref 17.3–27.3)
HCO3 BLDCOV-SCNC: 20.7 MMOL/L (ref 12.2–28.6)
HCO3 BLDCOV-SCNC: 22.9 MMOL/L (ref 12.2–28.6)
HCT VFR BLD AUTO: 36.4 % (ref 34.8–46.1)
HGB BLD-MCNC: 12.1 G/DL (ref 11.5–15.4)
HGB UR QL STRIP.AUTO: NEGATIVE
IMM GRANULOCYTES # BLD AUTO: 0.17 THOUSAND/UL (ref 0–0.2)
IMM GRANULOCYTES NFR BLD AUTO: 1 % (ref 0–2)
KETONES UR STRIP-MCNC: NEGATIVE MG/DL
LEUKOCYTE ESTERASE UR QL STRIP: NEGATIVE
LYMPHOCYTES # BLD AUTO: 3.5 THOUSANDS/ΜL (ref 0.6–4.47)
LYMPHOCYTES NFR BLD AUTO: 25 % (ref 14–44)
MCH RBC QN AUTO: 29.9 PG (ref 26.8–34.3)
MCHC RBC AUTO-ENTMCNC: 33.2 G/DL (ref 31.4–37.4)
MCV RBC AUTO: 90 FL (ref 82–98)
METHADONE UR QL: NEGATIVE
MONOCYTES # BLD AUTO: 0.86 THOUSAND/ΜL (ref 0.17–1.22)
MONOCYTES NFR BLD AUTO: 6 % (ref 4–12)
NEUTROPHILS # BLD AUTO: 8.56 THOUSANDS/ΜL (ref 1.85–7.62)
NEUTS SEG NFR BLD AUTO: 60 % (ref 43–75)
NITRITE UR QL STRIP: NEGATIVE
NON-SQ EPI CELLS URNS QL MICRO: NORMAL /HPF
NRBC BLD AUTO-RTO: 0 /100 WBCS
O2 CT VFR BLDCOA CALC: 10.2 ML/DL
O2 CT VFR BLDCOA CALC: 6.3 ML/DL
OPIATES UR QL SCN: NEGATIVE
OXYHGB MFR BLDCOA: 26.8 %
OXYHGB MFR BLDCOA: 44.8 %
OXYHGB MFR BLDCOV: 65.9 %
OXYHGB MFR BLDCOV: 72.5 %
PCO2 BLDCOA: 43.1 MM[HG] (ref 30–60)
PCO2 BLDCOA: 48.5 MM[HG] (ref 30–60)
PCO2 BLDCOV: 37.2 MM HG (ref 27–43)
PCO2 BLDCOV: 40.5 MM HG (ref 27–43)
PCP UR QL: NEGATIVE
PH BLDCOA: 7.23 [PH] (ref 7.23–7.43)
PH BLDCOA: 7.31 [PH] (ref 7.23–7.43)
PH BLDCOV: 7.36 [PH] (ref 7.19–7.49)
PH BLDCOV: 7.37 [PH] (ref 7.19–7.49)
PH UR STRIP.AUTO: 7 [PH]
PLATELET # BLD AUTO: 200 THOUSANDS/UL (ref 149–390)
PMV BLD AUTO: 12.4 FL (ref 8.9–12.7)
PO2 BLDCOA: 14.4 MM HG (ref 5–25)
PO2 BLDCOA: 21.1 MM HG (ref 5–25)
PO2 BLDCOV: 27.5 MM HG (ref 15–45)
PO2 BLDCOV: 30.9 MM HG (ref 15–45)
POTASSIUM SERPL-SCNC: 4 MMOL/L (ref 3.5–5.3)
PROT SERPL-MCNC: 6.9 G/DL (ref 6.4–8.2)
PROT UR STRIP-MCNC: NEGATIVE MG/DL
RBC # BLD AUTO: 4.05 MILLION/UL (ref 3.81–5.12)
RBC #/AREA URNS AUTO: NORMAL /HPF
SAO2 % BLDCOV: 16.2 ML/DL
SAO2 % BLDCOV: 16.4 ML/DL
SODIUM SERPL-SCNC: 136 MMOL/L (ref 136–145)
SP GR UR STRIP.AUTO: 1.01 (ref 1–1.03)
THC UR QL: NEGATIVE
UROBILINOGEN UR QL STRIP.AUTO: 0.2 E.U./DL
WBC # BLD AUTO: 14.26 THOUSAND/UL (ref 4.31–10.16)
WBC #/AREA URNS AUTO: NORMAL /HPF

## 2019-05-20 PROCEDURE — 81001 URINALYSIS AUTO W/SCOPE: CPT | Performed by: STUDENT IN AN ORGANIZED HEALTH CARE EDUCATION/TRAINING PROGRAM

## 2019-05-20 PROCEDURE — 99214 OFFICE O/P EST MOD 30 MIN: CPT

## 2019-05-20 PROCEDURE — 59409 OBSTETRICAL CARE: CPT | Performed by: OBSTETRICS & GYNECOLOGY

## 2019-05-20 PROCEDURE — 82805 BLOOD GASES W/O2 SATURATION: CPT | Performed by: OBSTETRICS & GYNECOLOGY

## 2019-05-20 PROCEDURE — 80053 COMPREHEN METABOLIC PANEL: CPT | Performed by: STUDENT IN AN ORGANIZED HEALTH CARE EDUCATION/TRAINING PROGRAM

## 2019-05-20 PROCEDURE — NC001 PR NO CHARGE: Performed by: OBSTETRICS & GYNECOLOGY

## 2019-05-20 PROCEDURE — 85025 COMPLETE CBC W/AUTO DIFF WBC: CPT | Performed by: STUDENT IN AN ORGANIZED HEALTH CARE EDUCATION/TRAINING PROGRAM

## 2019-05-20 PROCEDURE — 80307 DRUG TEST PRSMV CHEM ANLYZR: CPT | Performed by: STUDENT IN AN ORGANIZED HEALTH CARE EDUCATION/TRAINING PROGRAM

## 2019-05-20 PROCEDURE — 10907ZC DRAINAGE OF AMNIOTIC FLUID, THERAPEUTIC FROM PRODUCTS OF CONCEPTION, VIA NATURAL OR ARTIFICIAL OPENING: ICD-10-PCS | Performed by: OBSTETRICS & GYNECOLOGY

## 2019-05-20 RX ORDER — DIAPER,BRIEF,INFANT-TODD,DISP
1 EACH MISCELLANEOUS AS NEEDED
Status: DISCONTINUED | OUTPATIENT
Start: 2019-05-20 | End: 2019-05-22 | Stop reason: HOSPADM

## 2019-05-20 RX ORDER — OXYCODONE HYDROCHLORIDE 5 MG/1
5 TABLET ORAL ONCE
Status: COMPLETED | OUTPATIENT
Start: 2019-05-20 | End: 2019-05-20

## 2019-05-20 RX ORDER — ONDANSETRON 2 MG/ML
4 INJECTION INTRAMUSCULAR; INTRAVENOUS EVERY 6 HOURS PRN
Status: DISCONTINUED | OUTPATIENT
Start: 2019-05-20 | End: 2019-05-20

## 2019-05-20 RX ORDER — FENTANYL CITRATE 50 UG/ML
INJECTION, SOLUTION INTRAMUSCULAR; INTRAVENOUS AS NEEDED
Status: DISCONTINUED | OUTPATIENT
Start: 2019-05-20 | End: 2019-05-20 | Stop reason: SURG

## 2019-05-20 RX ORDER — ROPIVACAINE HYDROCHLORIDE 2 MG/ML
INJECTION, SOLUTION EPIDURAL; INFILTRATION; PERINEURAL AS NEEDED
Status: DISCONTINUED | OUTPATIENT
Start: 2019-05-20 | End: 2019-05-20 | Stop reason: SURG

## 2019-05-20 RX ORDER — CALCIUM CARBONATE 200(500)MG
1000 TABLET,CHEWABLE ORAL DAILY PRN
Status: DISCONTINUED | OUTPATIENT
Start: 2019-05-20 | End: 2019-05-22 | Stop reason: HOSPADM

## 2019-05-20 RX ORDER — ROPIVACAINE HYDROCHLORIDE 2 MG/ML
INJECTION, SOLUTION EPIDURAL; INFILTRATION; PERINEURAL
Status: DISPENSED
Start: 2019-05-20 | End: 2019-05-21

## 2019-05-20 RX ORDER — HYDROMORPHONE HCL/PF 1 MG/ML
0.5 SYRINGE (ML) INJECTION
Status: DISCONTINUED | OUTPATIENT
Start: 2019-05-20 | End: 2019-05-20

## 2019-05-20 RX ORDER — ALBUTEROL SULFATE 90 UG/1
2 AEROSOL, METERED RESPIRATORY (INHALATION) EVERY 6 HOURS PRN
Status: DISCONTINUED | OUTPATIENT
Start: 2019-05-20 | End: 2019-05-22 | Stop reason: HOSPADM

## 2019-05-20 RX ORDER — OXYCODONE HYDROCHLORIDE 5 MG/1
5 TABLET ORAL EVERY 4 HOURS PRN
Status: DISCONTINUED | OUTPATIENT
Start: 2019-05-20 | End: 2019-05-20

## 2019-05-20 RX ORDER — NITROGLYCERIN 20 MG/100ML
5-200 INJECTION INTRAVENOUS
Status: DISCONTINUED | OUTPATIENT
Start: 2019-05-20 | End: 2019-05-20

## 2019-05-20 RX ORDER — ONDANSETRON 2 MG/ML
4 INJECTION INTRAMUSCULAR; INTRAVENOUS EVERY 8 HOURS PRN
Status: DISCONTINUED | OUTPATIENT
Start: 2019-05-20 | End: 2019-05-22 | Stop reason: HOSPADM

## 2019-05-20 RX ORDER — TAMSULOSIN HYDROCHLORIDE 0.4 MG/1
0.4 CAPSULE ORAL
Status: DISCONTINUED | OUTPATIENT
Start: 2019-05-20 | End: 2019-05-21

## 2019-05-20 RX ORDER — OXYCODONE HYDROCHLORIDE AND ACETAMINOPHEN 5; 325 MG/1; MG/1
1 TABLET ORAL EVERY 4 HOURS PRN
Status: DISCONTINUED | OUTPATIENT
Start: 2019-05-20 | End: 2019-05-22 | Stop reason: HOSPADM

## 2019-05-20 RX ORDER — OXYCODONE HYDROCHLORIDE AND ACETAMINOPHEN 5; 325 MG/1; MG/1
2 TABLET ORAL EVERY 4 HOURS PRN
Status: DISCONTINUED | OUTPATIENT
Start: 2019-05-20 | End: 2019-05-22 | Stop reason: HOSPADM

## 2019-05-20 RX ORDER — SODIUM CHLORIDE, SODIUM LACTATE, POTASSIUM CHLORIDE, CALCIUM CHLORIDE 600; 310; 30; 20 MG/100ML; MG/100ML; MG/100ML; MG/100ML
125 INJECTION, SOLUTION INTRAVENOUS CONTINUOUS
Status: DISCONTINUED | OUTPATIENT
Start: 2019-05-20 | End: 2019-05-22 | Stop reason: HOSPADM

## 2019-05-20 RX ORDER — LIDOCAINE HYDROCHLORIDE AND EPINEPHRINE 15; 5 MG/ML; UG/ML
INJECTION, SOLUTION EPIDURAL
Status: COMPLETED | OUTPATIENT
Start: 2019-05-20 | End: 2019-05-20

## 2019-05-20 RX ORDER — SODIUM CHLORIDE, SODIUM LACTATE, POTASSIUM CHLORIDE, CALCIUM CHLORIDE 600; 310; 30; 20 MG/100ML; MG/100ML; MG/100ML; MG/100ML
1000 INJECTION, SOLUTION INTRAVENOUS ONCE
Status: COMPLETED | OUTPATIENT
Start: 2019-05-20 | End: 2019-05-20

## 2019-05-20 RX ORDER — FENTANYL CITRATE 50 UG/ML
INJECTION, SOLUTION INTRAMUSCULAR; INTRAVENOUS
Status: COMPLETED
Start: 2019-05-20 | End: 2019-05-20

## 2019-05-20 RX ORDER — ACETAMINOPHEN 325 MG/1
650 TABLET ORAL EVERY 6 HOURS PRN
Status: DISCONTINUED | OUTPATIENT
Start: 2019-05-20 | End: 2019-05-22 | Stop reason: HOSPADM

## 2019-05-20 RX ORDER — NITROGLYCERIN 0.4 MG/1
TABLET SUBLINGUAL
Status: DISCONTINUED
Start: 2019-05-20 | End: 2019-05-21 | Stop reason: WASHOUT

## 2019-05-20 RX ORDER — DOCUSATE SODIUM 100 MG/1
100 CAPSULE, LIQUID FILLED ORAL 2 TIMES DAILY
Status: DISCONTINUED | OUTPATIENT
Start: 2019-05-21 | End: 2019-05-22 | Stop reason: HOSPADM

## 2019-05-20 RX ORDER — TAMSULOSIN HYDROCHLORIDE 0.4 MG/1
0.4 CAPSULE ORAL
Status: DISCONTINUED | OUTPATIENT
Start: 2019-05-20 | End: 2019-05-20

## 2019-05-20 RX ORDER — OXYTOCIN/RINGER'S LACTATE 30/500 ML
1-30 PLASTIC BAG, INJECTION (ML) INTRAVENOUS
Status: DISCONTINUED | OUTPATIENT
Start: 2019-05-20 | End: 2019-05-22 | Stop reason: HOSPADM

## 2019-05-20 RX ORDER — OXYCODONE HYDROCHLORIDE 10 MG/1
10 TABLET ORAL EVERY 4 HOURS PRN
Status: DISCONTINUED | OUTPATIENT
Start: 2019-05-20 | End: 2019-05-20

## 2019-05-20 RX ORDER — IBUPROFEN 600 MG/1
600 TABLET ORAL EVERY 6 HOURS PRN
Status: DISCONTINUED | OUTPATIENT
Start: 2019-05-20 | End: 2019-05-22 | Stop reason: HOSPADM

## 2019-05-20 RX ORDER — DIPHENHYDRAMINE HCL 25 MG
25 TABLET ORAL EVERY 6 HOURS PRN
Status: DISCONTINUED | OUTPATIENT
Start: 2019-05-20 | End: 2019-05-22 | Stop reason: HOSPADM

## 2019-05-20 RX ADMIN — ROPIVACAINE HYDROCHLORIDE 8 ML: 2 INJECTION, SOLUTION EPIDURAL; INFILTRATION at 21:25

## 2019-05-20 RX ADMIN — SODIUM CHLORIDE, SODIUM LACTATE, POTASSIUM CHLORIDE, AND CALCIUM CHLORIDE 125 ML/HR: .6; .31; .03; .02 INJECTION, SOLUTION INTRAVENOUS at 11:27

## 2019-05-20 RX ADMIN — TAMSULOSIN HYDROCHLORIDE 0.4 MG: 0.4 CAPSULE ORAL at 01:34

## 2019-05-20 RX ADMIN — OXYCODONE HYDROCHLORIDE 5 MG: 5 TABLET ORAL at 01:29

## 2019-05-20 RX ADMIN — SODIUM CHLORIDE, SODIUM LACTATE, POTASSIUM CHLORIDE, AND CALCIUM CHLORIDE 999 ML/HR: .6; .31; .03; .02 INJECTION, SOLUTION INTRAVENOUS at 18:35

## 2019-05-20 RX ADMIN — Medication 2 MILLI-UNITS/MIN: at 10:43

## 2019-05-20 RX ADMIN — OXYCODONE HYDROCHLORIDE 10 MG: 10 TABLET ORAL at 05:33

## 2019-05-20 RX ADMIN — SODIUM CHLORIDE, SODIUM LACTATE, POTASSIUM CHLORIDE, AND CALCIUM CHLORIDE 1000 ML/HR: .6; .31; .03; .02 INJECTION, SOLUTION INTRAVENOUS at 00:39

## 2019-05-20 RX ADMIN — SODIUM CHLORIDE, SODIUM LACTATE, POTASSIUM CHLORIDE, AND CALCIUM CHLORIDE 999 ML/HR: .6; .31; .03; .02 INJECTION, SOLUTION INTRAVENOUS at 19:38

## 2019-05-20 RX ADMIN — ROPIVACAINE HYDROCHLORIDE: 2 INJECTION, SOLUTION EPIDURAL; INFILTRATION at 19:53

## 2019-05-20 RX ADMIN — OXYCODONE HYDROCHLORIDE 5 MG: 5 TABLET ORAL at 16:08

## 2019-05-20 RX ADMIN — OXYCODONE HYDROCHLORIDE 5 MG: 5 TABLET ORAL at 00:39

## 2019-05-20 RX ADMIN — FENTANYL CITRATE 100 MCG: 50 INJECTION, SOLUTION INTRAMUSCULAR; INTRAVENOUS at 21:25

## 2019-05-20 RX ADMIN — HYDROMORPHONE HYDROCHLORIDE 0.5 MG: 1 INJECTION, SOLUTION INTRAMUSCULAR; INTRAVENOUS; SUBCUTANEOUS at 03:42

## 2019-05-20 RX ADMIN — LIDOCAINE HYDROCHLORIDE AND EPINEPHRINE 3 ML: 15; 5 INJECTION, SOLUTION EPIDURAL at 19:44

## 2019-05-20 RX ADMIN — TAMSULOSIN HYDROCHLORIDE 0.4 MG: 0.4 CAPSULE ORAL at 18:59

## 2019-05-20 RX ADMIN — OXYCODONE HYDROCHLORIDE 10 MG: 10 TABLET ORAL at 09:55

## 2019-05-20 RX ADMIN — SODIUM CHLORIDE, SODIUM LACTATE, POTASSIUM CHLORIDE, AND CALCIUM CHLORIDE 125 ML/HR: .6; .31; .03; .02 INJECTION, SOLUTION INTRAVENOUS at 01:30

## 2019-05-21 PROBLEM — Z72.0 TOBACCO USE: Status: ACTIVE | Noted: 2018-11-29

## 2019-05-21 PROBLEM — O09.90 HIGH RISK PREGNANCY, ANTEPARTUM: Status: RESOLVED | Noted: 2018-11-28 | Resolved: 2019-05-21

## 2019-05-21 PROBLEM — R12 HEART BURN: Status: RESOLVED | Noted: 2019-03-27 | Resolved: 2019-05-21

## 2019-05-21 PROBLEM — O09.529 ANTEPARTUM MULTIGRAVIDA OF ADVANCED MATERNAL AGE: Status: RESOLVED | Noted: 2018-11-28 | Resolved: 2019-05-21

## 2019-05-21 PROBLEM — Z87.59 HISTORY OF CHOLESTASIS DURING PREGNANCY: Status: ACTIVE | Noted: 2019-05-21

## 2019-05-21 PROBLEM — Z98.890 HISTORY OF CERVICAL LEEP BIOPSY AFFECTING CARE OF MOTHER, ANTEPARTUM: Status: RESOLVED | Noted: 2018-11-28 | Resolved: 2019-05-21

## 2019-05-21 PROBLEM — Z34.93 PRENATAL CARE IN THIRD TRIMESTER: Status: RESOLVED | Noted: 2019-05-16 | Resolved: 2019-05-21

## 2019-05-21 PROBLEM — R73.09 ELEVATED GLUCOSE: Status: RESOLVED | Noted: 2019-03-14 | Resolved: 2019-05-21

## 2019-05-21 PROBLEM — O34.40 HISTORY OF CERVICAL LEEP BIOPSY AFFECTING CARE OF MOTHER, ANTEPARTUM: Status: RESOLVED | Noted: 2018-11-28 | Resolved: 2019-05-21

## 2019-05-21 PROBLEM — O30.043 DICHORIONIC DIAMNIOTIC TWIN PREGNANCY IN THIRD TRIMESTER: Status: RESOLVED | Noted: 2018-11-28 | Resolved: 2019-05-21

## 2019-05-21 PROBLEM — Z87.19 HISTORY OF CHOLESTASIS DURING PREGNANCY: Status: ACTIVE | Noted: 2019-05-21

## 2019-05-21 LAB
ALBUMIN SERPL BCP-MCNC: 1.7 G/DL (ref 3.5–5)
ALP SERPL-CCNC: 444 U/L (ref 46–116)
ALT SERPL W P-5'-P-CCNC: 113 U/L (ref 12–78)
ANION GAP SERPL CALCULATED.3IONS-SCNC: 9 MMOL/L (ref 4–13)
AST SERPL W P-5'-P-CCNC: 103 U/L (ref 5–45)
BILE AC SERPL-SCNC: 9.2 UMOL/L (ref 4.7–24.5)
BILIRUB SERPL-MCNC: 0.23 MG/DL (ref 0.2–1)
BUN SERPL-MCNC: 5 MG/DL (ref 5–25)
CALCIUM SERPL-MCNC: 8.7 MG/DL (ref 8.3–10.1)
CHLORIDE SERPL-SCNC: 107 MMOL/L (ref 100–108)
CO2 SERPL-SCNC: 23 MMOL/L (ref 21–32)
CREAT SERPL-MCNC: 0.68 MG/DL (ref 0.6–1.3)
GFR SERPL CREATININE-BSD FRML MDRD: 111 ML/MIN/1.73SQ M
GLUCOSE SERPL-MCNC: 124 MG/DL (ref 65–140)
POTASSIUM SERPL-SCNC: 3.9 MMOL/L (ref 3.5–5.3)
PROT SERPL-MCNC: 5.3 G/DL (ref 6.4–8.2)
SODIUM SERPL-SCNC: 139 MMOL/L (ref 136–145)

## 2019-05-21 PROCEDURE — 99024 POSTOP FOLLOW-UP VISIT: CPT | Performed by: OBSTETRICS & GYNECOLOGY

## 2019-05-21 PROCEDURE — 88307 TISSUE EXAM BY PATHOLOGIST: CPT | Performed by: PATHOLOGY

## 2019-05-21 PROCEDURE — 80053 COMPREHEN METABOLIC PANEL: CPT | Performed by: STUDENT IN AN ORGANIZED HEALTH CARE EDUCATION/TRAINING PROGRAM

## 2019-05-21 PROCEDURE — NC001 PR NO CHARGE: Performed by: OBSTETRICS & GYNECOLOGY

## 2019-05-21 RX ORDER — ACETAMINOPHEN 325 MG/1
650 TABLET ORAL EVERY 6 HOURS PRN
Qty: 30 TABLET | Refills: 0 | Status: ON HOLD
Start: 2019-05-21 | End: 2019-09-13 | Stop reason: SDUPTHER

## 2019-05-21 RX ORDER — IBUPROFEN 200 MG
600 TABLET ORAL EVERY 6 HOURS PRN
Status: ON HOLD
Start: 2019-05-21 | End: 2019-09-13 | Stop reason: SDUPTHER

## 2019-05-21 RX ADMIN — OXYCODONE HYDROCHLORIDE AND ACETAMINOPHEN 1 TABLET: 5; 325 TABLET ORAL at 20:32

## 2019-05-21 RX ADMIN — IBUPROFEN 600 MG: 600 TABLET ORAL at 08:59

## 2019-05-21 RX ADMIN — BENZOCAINE AND LEVOMENTHOL: 200; 5 SPRAY TOPICAL at 01:31

## 2019-05-21 RX ADMIN — OXYCODONE HYDROCHLORIDE AND ACETAMINOPHEN 1 TABLET: 5; 325 TABLET ORAL at 00:24

## 2019-05-21 RX ADMIN — DOCUSATE SODIUM 100 MG: 100 CAPSULE, LIQUID FILLED ORAL at 18:17

## 2019-05-21 RX ADMIN — DOCUSATE SODIUM 100 MG: 100 CAPSULE, LIQUID FILLED ORAL at 08:59

## 2019-05-21 RX ADMIN — WITCH HAZEL 1 PAD: 500 SOLUTION RECTAL; TOPICAL at 01:31

## 2019-05-22 VITALS
RESPIRATION RATE: 18 BRPM | WEIGHT: 202 LBS | BODY MASS INDEX: 33.66 KG/M2 | HEART RATE: 84 BPM | HEIGHT: 65 IN | DIASTOLIC BLOOD PRESSURE: 69 MMHG | SYSTOLIC BLOOD PRESSURE: 102 MMHG | OXYGEN SATURATION: 98 % | TEMPERATURE: 97.8 F

## 2019-05-22 PROBLEM — O26.649 INTRAHEPATIC CHOLESTASIS OF PREGNANCY: Status: ACTIVE | Noted: 2019-05-22

## 2019-05-22 PROBLEM — O26.619 INTRAHEPATIC CHOLESTASIS OF PREGNANCY: Status: ACTIVE | Noted: 2019-05-22

## 2019-05-22 PROBLEM — K83.1 INTRAHEPATIC CHOLESTASIS OF PREGNANCY: Status: ACTIVE | Noted: 2019-05-22

## 2019-05-22 PROCEDURE — 99024 POSTOP FOLLOW-UP VISIT: CPT | Performed by: OBSTETRICS & GYNECOLOGY

## 2019-05-22 RX ADMIN — OXYCODONE HYDROCHLORIDE AND ACETAMINOPHEN 1 TABLET: 5; 325 TABLET ORAL at 10:04

## 2019-05-22 RX ADMIN — ENOXAPARIN SODIUM 40 MG: 40 INJECTION SUBCUTANEOUS at 09:20

## 2019-05-22 RX ADMIN — DOCUSATE SODIUM 100 MG: 100 CAPSULE, LIQUID FILLED ORAL at 09:23

## 2019-06-06 ENCOUNTER — ROUTINE PRENATAL (OUTPATIENT)
Dept: OBGYN CLINIC | Facility: CLINIC | Age: 39
End: 2019-06-06

## 2019-06-06 VITALS
DIASTOLIC BLOOD PRESSURE: 76 MMHG | HEART RATE: 103 BPM | WEIGHT: 172.8 LBS | SYSTOLIC BLOOD PRESSURE: 111 MMHG | BODY MASS INDEX: 28.76 KG/M2

## 2019-06-06 PROCEDURE — 99213 OFFICE O/P EST LOW 20 MIN: CPT | Performed by: STUDENT IN AN ORGANIZED HEALTH CARE EDUCATION/TRAINING PROGRAM

## 2019-06-10 ENCOUNTER — PATIENT OUTREACH (OUTPATIENT)
Dept: OBGYN CLINIC | Facility: CLINIC | Age: 39
End: 2019-06-10

## 2019-06-10 DIAGNOSIS — Z78.9 NEED FOR FOLLOW-UP BY SOCIAL WORKER: Primary | ICD-10-CM

## 2019-06-12 ENCOUNTER — POSTPARTUM VISIT (OUTPATIENT)
Dept: OBGYN CLINIC | Facility: CLINIC | Age: 39
End: 2019-06-12

## 2019-06-12 VITALS
HEART RATE: 88 BPM | BODY MASS INDEX: 29.32 KG/M2 | SYSTOLIC BLOOD PRESSURE: 108 MMHG | WEIGHT: 176 LBS | HEIGHT: 65 IN | DIASTOLIC BLOOD PRESSURE: 72 MMHG

## 2019-06-12 DIAGNOSIS — Z30.2 REQUEST FOR STERILIZATION: ICD-10-CM

## 2019-06-12 PROCEDURE — 99213 OFFICE O/P EST LOW 20 MIN: CPT | Performed by: OBSTETRICS & GYNECOLOGY

## 2019-07-10 ENCOUNTER — PATIENT OUTREACH (OUTPATIENT)
Dept: OBGYN CLINIC | Facility: CLINIC | Age: 39
End: 2019-07-10

## 2019-07-10 NOTE — PROGRESS NOTES
Follow up call placed to patient to provide psychosocial support  Patient reports she is feeling much better than before  She reports that her family is continuing to help her with care of twins and although she does feel stressed at times she states that she does not feel as overwhelmed as she did previously  Patient reports she has not contacted [employer] [facility] because she has not felt the need to reach out for their support  Reviewed outpatient mental health counseling as an option for patient as well however she declines referral at this time again stating that she is coping well at present  Supportive counseling provided to patient  She will contact MARITZA DESAI for further support as needed

## 2019-07-18 ENCOUNTER — OFFICE VISIT (OUTPATIENT)
Dept: OBGYN CLINIC | Facility: CLINIC | Age: 39
End: 2019-07-18

## 2019-07-18 VITALS
WEIGHT: 187.4 LBS | HEIGHT: 65 IN | DIASTOLIC BLOOD PRESSURE: 72 MMHG | SYSTOLIC BLOOD PRESSURE: 103 MMHG | HEART RATE: 87 BPM | BODY MASS INDEX: 31.22 KG/M2

## 2019-07-18 DIAGNOSIS — Z30.42 ENCOUNTER FOR SURVEILLANCE OF INJECTABLE CONTRACEPTIVE: Primary | ICD-10-CM

## 2019-07-18 DIAGNOSIS — Z12.4 CERVICAL CANCER SCREENING: ICD-10-CM

## 2019-07-18 DIAGNOSIS — Z30.2 REQUEST FOR STERILIZATION: ICD-10-CM

## 2019-07-18 DIAGNOSIS — IMO0001 CONTRACEPTION: ICD-10-CM

## 2019-07-18 DIAGNOSIS — N87.0 DYSPLASIA OF CERVIX, LOW GRADE (CIN 1): ICD-10-CM

## 2019-07-18 LAB — SL AMB POCT URINE HCG: NEGATIVE

## 2019-07-18 PROCEDURE — 87624 HPV HI-RISK TYP POOLED RSLT: CPT | Performed by: OBSTETRICS & GYNECOLOGY

## 2019-07-18 PROCEDURE — G0145 SCR C/V CYTO,THINLAYER,RESCR: HCPCS | Performed by: OBSTETRICS & GYNECOLOGY

## 2019-07-18 PROCEDURE — 81025 URINE PREGNANCY TEST: CPT | Performed by: OBSTETRICS & GYNECOLOGY

## 2019-07-18 PROCEDURE — 99213 OFFICE O/P EST LOW 20 MIN: CPT | Performed by: OBSTETRICS & GYNECOLOGY

## 2019-07-18 PROCEDURE — 96372 THER/PROPH/DIAG INJ SC/IM: CPT | Performed by: OBSTETRICS & GYNECOLOGY

## 2019-07-18 RX ORDER — MEDROXYPROGESTERONE ACETATE 150 MG/ML
150 INJECTION, SUSPENSION INTRAMUSCULAR ONCE
Status: CANCELLED | OUTPATIENT
Start: 2019-07-18 | End: 2019-07-18

## 2019-07-18 RX ORDER — MEDROXYPROGESTERONE ACETATE 150 MG/ML
150 INJECTION, SUSPENSION INTRAMUSCULAR
Qty: 1 ML | Refills: 0 | Status: SHIPPED | OUTPATIENT
Start: 2019-07-18

## 2019-07-18 NOTE — PROGRESS NOTES
Depo-Provera     Patient provided box    Syringe    Last Depo date: first depo   Side effects: none   HCG: negative   Given by: MARÍA Briggs   Site: Left deltoid    This depo is a bridge until her salpingectomy on 9/2/19     Calcium supplement daily teaching, condoms for 2 weeks following first injection dose

## 2019-07-18 NOTE — PROGRESS NOTES
H&P Exam  Kelly Burrell 44 y o  female MRN: [id number]  Unit/Bed#:  Encounter: [id number]      HPI:  Kelly Burrell is a 44 y o  [id number] female who will be undergoing bilateral salpingectomy on 2019  She delivered di/di twins on 2020   Pregnancy complicated ***    Hep B vaccine today ***    LMP: 10/2018  Menarche: 14  Cycles were regular, every 28-30 days, lasting 5 days  Contraception: none, will receive 1 dose of depo provera today     Allergies: None  Medications: Ferrous sulfate  Last pap smear: ***  Family history: ***  ETOH: social  Drug use: smokes ***    Blood use during admission if needed: yes    OB History    Para Term  AB Living   5 5 4 1   6   SAB TAB Ectopic Multiple Live Births         1 6      # Outcome Date GA Lbr Andrew/2nd Weight Sex Delivery Anes PTL Lv   5A  19 36w3d  2780 g (6 lb 2 1 oz) M Vag-Spont EPI N MARY   5B  19 36w3d  2670 g (5 lb 14 2 oz) F Vag-Breech EPI N MARY   4 Term 16 39w4d 12:34 / 00:22 3941 g (8 lb 11 oz) M Vag-Spont EPI N MARY   3 Term 08 41w0d  3430 g (7 lb 9 oz) F Vag-Spont   MARY   2 Term 02 38w0d  3289 g (7 lb 4 oz) F Vag-Spont EPI  MARY   1 Term 10/08/99 38w0d  2948 g (6 lb 8 oz) M Vag-Spont EPI  MARY       Review of Systems    Historical Information   Past Medical History:   Diagnosis Date    Abnormal Pap smear of cervix     HGSIL 2016    Asthma     Cyst in neck at birth    Rejirio Ma HPV (human papilloma virus) infection     Hx of blood clots     Intrahepatic cholestasis of pregnancy 2019    Varicella     as child     Past Surgical History:   Procedure Laterality Date    BRONCHOSCOPY      CERVICAL BIOPSY  W/ LOOP ELECTRODE EXCISION  2016    CYST REMOVAL      LITHOTRIPSY      pf both kidneys, stents placed    TONSILECTOMY AND ADNOIDECTOMY      WISDOM TOOTH EXTRACTION       Social History   Social History     Substance and Sexual Activity   Alcohol Use Yes    Frequency: Monthly or less    Binge frequency: Never     Social History     Substance and Sexual Activity   Drug Use No     Social History     Tobacco Use   Smoking Status Current Every Day Smoker    Types: Cigarettes   Smokeless Tobacco Never Used     Family History: {SL IP FAM HISTORY SMARTLIST:[id number]}    Meds/Allergies      (Not in a hospital admission)     Allergies   Allergen Reactions    Bee Venom Throat Swelling    Codeine Anaphylaxis and GI Intolerance    Penicillins Anaphylaxis    Pineapple Flavor Throat Swelling    Vancomycin Anaphylaxis    Iodinated Diagnostic Agents Palpitations    Metrizamide Palpitations    Sulfa Antibiotics Rash       OBJECTIVE:    Vitals: Blood pressure 103/72, pulse 87, height 5' 5" (1 651 m), weight 85 kg (187 lb 6 4 oz), not currently breastfeeding  Body mass index is 31 18 kg/m²  Physical Exam    Assessment/Plan     ASSESSMENT:  ***yo G*** P*** female who is scheduled for *** on ***      PLAN:    1) ***    2) STD testing  ***    3) Routine screening  ***    Robert Azul MD  7/18/2019  10:08 AM

## 2019-07-18 NOTE — H&P
H&P Exam  Kylah Hansen 44 y o  female MRN: [id number]  Unit/Bed#:  Encounter: [id number]      HPI:  Kylah Hansen is a 44 y o  [id number] female who will be undergoing bilateral salpingectomy on 2019  She delivered di/di twins on 20  Pregnancy was complicated by intrahepatic cholestasis of pregnancy, history of nephrolithiasis, history of THC use in pregnancy, non-immunity to Hepatitis B, and smoking  She was given 6 weeks of lovenox for postpartum anticoagulation due to history of DVT during pregnancy  She has finished 6 weeks of lovenox  LMP: 10/2018, has not had menses since delivery  Menarche: 14  Cycles were previously regular, every 28-30 days, lasting 5 days  Contraception: none, will receive 1 dose of depo provera today prior to surgery     Allergies: None  Medications: Albuterol as needed  Pap history:   2017: Patient with high-grade Pap and JOHN-3 on colposcopy underwent a LEEP in OR with final pathology resulted as JOHN-1  Repeat co-testing in one year  2018: pap smear NILM, HPV negative (12 month nilsa)  Family history: No family history of breast, uterine, ovarian, cervical cancer  ETOH: social  Drug use: nicotine use (just restarted 2 days ago)    Blood use during admission if needed: yes, not a Scientology    OB History    Para Term  AB Living   5 5 4 1   6   SAB TAB Ectopic Multiple Live Births         1 6      # Outcome Date GA Lbr Andrew/2nd Weight Sex Delivery Anes PTL Lv   5A  19 36w3d  2780 g (6 lb 2 1 oz) M Vag-Spont EPI N MARY   5B  19 36w3d  2670 g (5 lb 14 2 oz) F Vag-Breech EPI N MARY   4 Term 16 39w4d 12:34 / 00:22 3941 g (8 lb 11 oz) M Vag-Spont EPI N MARY   3 Term 08 41w0d  3430 g (7 lb 9 oz) F Vag-Spont   MARY   2 Term 02 38w0d  3289 g (7 lb 4 oz) F Vag-Spont EPI  MARY   1 Term 10/08/99 38w0d  2948 g (6 lb 8 oz) M Vag-Spont EPI  MARY       Review of Systems   Constitutional: Negative      HENT: Negative  Eyes: Negative  Respiratory: Negative  Cardiovascular: Negative  Gastrointestinal: Negative  Endocrine: Negative  Genitourinary: Negative  Musculoskeletal: Negative  Allergic/Immunologic: Negative  Neurological: Negative  Hematological: Negative  Psychiatric/Behavioral: Negative  Historical Information   Past Medical History:   Diagnosis Date    Abnormal Pap smear of cervix     HGSIL 2016    Asthma     Cyst in neck at birth    Carmelo Brownlee HPV (human papilloma virus) infection     Hx of blood clots     Intrahepatic cholestasis of pregnancy 5/22/2019    Varicella     as child     Past Surgical History:   Procedure Laterality Date    BRONCHOSCOPY      CERVICAL BIOPSY  W/ LOOP ELECTRODE EXCISION  2016    CYST REMOVAL      LITHOTRIPSY      pf both kidneys, stents placed    TONSILECTOMY AND ADNOIDECTOMY      WISDOM TOOTH EXTRACTION       Social History   Social History     Substance and Sexual Activity   Alcohol Use Yes    Frequency: Monthly or less    Binge frequency: Never     Social History     Substance and Sexual Activity   Drug Use No     Social History     Tobacco Use   Smoking Status Current Every Day Smoker    Types: Cigarettes   Smokeless Tobacco Never Used     Family History: non-contributory    Meds/Allergies      (Not in a hospital admission)     Allergies   Allergen Reactions    Bee Venom Throat Swelling    Codeine Anaphylaxis and GI Intolerance    Penicillins Anaphylaxis    Pineapple Flavor Throat Swelling    Vancomycin Anaphylaxis    Iodinated Diagnostic Agents Palpitations    Metrizamide Palpitations    Sulfa Antibiotics Rash       OBJECTIVE:    Vitals: Blood pressure 103/72, pulse 87, height 5' 5" (1 651 m), weight 85 kg (187 lb 6 4 oz), not currently breastfeeding  Body mass index is 31 18 kg/m²  Physical Exam   Constitutional: She is oriented to person, place, and time  She appears well-developed and well-nourished     Cardiovascular: Normal rate, regular rhythm and normal heart sounds  Pulmonary/Chest: Effort normal and breath sounds normal    Abdominal: Soft  Bowel sounds are normal    Genitourinary: Vagina normal    Genitourinary Comments: Normal external genitalia, urethral meatus midline, no cervical or vaginal masses or lesions, multiparous cervix, areas of ectropion noted, no blood, mild physiologic discharge noted   Neurological: She is alert and oriented to person, place, and time  Vitals reviewed  Assessment/Plan     ASSESSMENT:  44yo  female who is scheduled for bilateral salpingectomy on 2019  PLAN:    Desire for sterilization  Discussed with pt LARC methods of contraception including intrauterine device, Nexplanon, and Depo Provera in addition to surgical sterilization  Discussed the risks, benefits, and alternatives to each  Discussed that the Nexplanon has been proven to be the most effective form of contraception; pt desires permanence  Discussed that Mirena IUD has excellent bleeding profile that she would be unable to achieve with surgical sterilization alone; pt is uninterested in bleeding profile  Discussed that surgical sterilization is a PERMANENT and IRREVERSIBLE surgical procedure; pt demonstrated understanding and continued desire to proceed  Patient is okay if she does not have any more children, even if she dates a different partner or loses her children      Discussed risks of surgical procedures, including risk of bleeding, blood clots, infection, injury to soft tissue or surrounding organs, permanent disability, or even death  Discussed procedure in detail  Discussed the various methods of surgical sterilization including tubal ligation and salpingectomy, and that route of procedure will be dependent on the physician covering the procedure  Pt demonstrated understanding and expressed a desire for salpingectomy as the preferred method   Pt had opportunity to ask questions and all questions were answered to patient's satisfaction  Sterilization consent form signed today  MA consent form signed 1/17/2019 and again with Dr Kermit To on 6/12/2019  Hibiclens preop soap given to patient  She is to arrive for surgery on 8/2/2019  Contraception  Currently not on contraction but sexually active  Amenable to 1 dose of depo provera today to ensure that she does not get pregnant prior to surgery    Postpartum blues  She is doing well today, reports she is getting the support she needs  She was able to speak with Karlie Aponte () on 7/10/19  She currently declines outpatient mental health counseling and support [facility]  She is here with her partner who is supportive and helpful with taking care of her twins  History of DVT during pregnancy  S/p 6 weeks of prophylactic lovenox    Pap history  5/1/2017: Patient with high-grade Pap and JOHN-3 on colposcopy underwent a LEEP in OR with final pathology resulted as JOHN-1  Repeat co-testing in one year  11/2018: pap smear NILM, HPV negative (12 month nilsa)  7/18/2019: pap smear collected today (24 month nilsa)  If normal, repeat cotesting in 3 years  Non immune to Hepatitis B  Discussed initiation of vaccine series - she declines today         Nichole Carvajal MD  7/18/2019  11:05 AM

## 2019-07-18 NOTE — ASSESSMENT & PLAN NOTE
5/1/2017: Patient with high-grade Pap and JOHN-3 on colposcopy underwent a LEEP in OR with final pathology resulted as JOHN-1  Repeat co-testing in one year  11/2018: pap smear NILM, HPV negative (12 month nilsa)  7/18/2019: pap smear collected today (24 month nilsa)  If normal, repeat cotesting in 3 years

## 2019-07-19 LAB
HPV HR 12 DNA CVX QL NAA+PROBE: NEGATIVE
HPV16 DNA CVX QL NAA+PROBE: NEGATIVE
HPV18 DNA CVX QL NAA+PROBE: NEGATIVE

## 2019-07-22 PROBLEM — Z64.1 MULTIPARITY: Status: ACTIVE | Noted: 2019-07-22

## 2019-07-22 LAB
LAB AP GYN PRIMARY INTERPRETATION: NORMAL
Lab: NORMAL

## 2019-08-22 ENCOUNTER — OFFICE VISIT (OUTPATIENT)
Dept: OBGYN CLINIC | Facility: CLINIC | Age: 39
End: 2019-08-22

## 2019-08-22 VITALS
SYSTOLIC BLOOD PRESSURE: 129 MMHG | HEART RATE: 99 BPM | BODY MASS INDEX: 32.49 KG/M2 | DIASTOLIC BLOOD PRESSURE: 84 MMHG | WEIGHT: 195 LBS | HEIGHT: 65 IN

## 2019-08-22 DIAGNOSIS — Z30.09 STERILIZATION CONSULT: Primary | ICD-10-CM

## 2019-08-22 PROCEDURE — 99213 OFFICE O/P EST LOW 20 MIN: CPT | Performed by: OBSTETRICS & GYNECOLOGY

## 2019-08-22 NOTE — ASSESSMENT & PLAN NOTE
Scheduled for bilateral salpingectomy 9/13/19  Additional consent form signed 08/08/2019  Preoperative and postoperative expectations reviewed in detail  Risks reviewed-desires to proceed with surgery  RTC 2 weeks for postoperative visit

## 2019-08-22 NOTE — PROGRESS NOTES
Request for sterilization  Scheduled for bilateral salpingectomy 9/13/19  Additional consent form signed 08/08/2019  Preoperative and postoperative expectations reviewed in detail  Risks reviewed-desires to proceed with surgery  RTC 2 weeks for postoperative visit  43 yo P6 for preoperative history and physical   Schedule for bilateral salpingectomy 9/13/19  Confirmed that MA Vlad was signed  Review of Systems   Constitutional: Negative for chills, diaphoresis, fatigue and fever  Respiratory: Negative for cough, choking, chest tightness and shortness of breath  Cardiovascular: Negative for chest pain, palpitations and leg swelling  Gastrointestinal: Negative for abdominal pain, constipation, diarrhea, nausea and vomiting  Genitourinary: Negative for menstrual problem, vaginal bleeding, vaginal discharge and vaginal pain  Musculoskeletal: Negative for arthralgias, back pain, myalgias and neck stiffness  Neurological: Negative for dizziness, weakness, light-headedness and headaches  Psychiatric/Behavioral: Negative for self-injury, sleep disturbance and suicidal ideas  The patient is not nervous/anxious        Past Medical History:   Diagnosis Date    Abnormal Pap smear of cervix     HGSIL 2016    Asthma     Cyst in neck at birth    [facility] HPV (human papilloma virus) infection     Hx of blood clots     Intrahepatic cholestasis of pregnancy 5/22/2019    Varicella     as child     Past Surgical History:   Procedure Laterality Date    BRONCHOSCOPY      CERVICAL BIOPSY  W/ LOOP ELECTRODE EXCISION  2016    CYST REMOVAL      LITHOTRIPSY      pf both kidneys, stents placed    TONSILECTOMY AND ADNOIDECTOMY      WISDOM TOOTH EXTRACTION       Current Outpatient Medications on File Prior to Visit   Medication Sig Dispense Refill    acetaminophen (TYLENOL) 325 mg tablet Take 2 tablets (650 mg total) by mouth every 6 (six) hours as needed for headaches (Patient not taking: Reported on 7/18/2019) 30 tablet 0    albuterol (PROVENTIL HFA) 90 mcg/act inhaler Inhale 2 puffs every 6 (six) hours as needed      enoxaparin (LOVENOX) 40 mg/0 4 mL Inject 0 4 mL (40 mg total) under the skin daily for 42 days 16 8 mL 0    enoxaparin (LOVENOX) 40 mg/0 4 mL Inject 0 4 mL (40 mg total) under the skin every 24 hours for 9 doses 3 6 mL 0    ferrous sulfate 324 (65 Fe) mg Take 1 tablet (324 mg total) by mouth 2 (two) times a day before meals (Patient not taking: Reported on 7/18/2019) 30 tablet 2    ibuprofen (MOTRIN) 200 mg tablet Take 3 tablets (600 mg total) by mouth every 6 (six) hours as needed for mild pain      medroxyPROGESTERone (DEPO-PROVERA) 150 mg/mL injection Inject 1 mL (150 mg total) into a muscle every 3 (three) months 1 mL 0    multivitamin (FLINTSTONES) 60 mg chewable tablet Chew 2 tablets daily       No current facility-administered medications on file prior to visit          Allergies   Allergen Reactions    Bee Venom Throat Swelling    Codeine Anaphylaxis and GI Intolerance    Penicillins Anaphylaxis    Pineapple Flavor Throat Swelling    Vancomycin Anaphylaxis    Iodinated Diagnostic Agents Palpitations    Metrizamide Palpitations    Sulfa Antibiotics Rash     Social History     Socioeconomic History    Marital status: /Civil Union     Spouse name: Not on file    Number of children: Not on file    Years of education: Not on file    Highest education level: Not on file   Occupational History    Not on file   Social Needs    Financial resource strain: Somewhat hard    Food insecurity:     Worry: Never true     Inability: Never true    Transportation needs:     Medical: No     Non-medical: No   Tobacco Use    Smoking status: Current Every Day Smoker     Types: Cigarettes    Smokeless tobacco: Never Used   Substance and Sexual Activity    Alcohol use: Yes     Frequency: Monthly or less     Binge frequency: Never    Drug use: No    Sexual activity: Yes     Partners: Male Birth control/protection: None   Lifestyle    Physical activity:     Days per week: Not on file     Minutes per session: Not on file    Stress: Very much   Relationships    Social connections:     Talks on phone: More than three times a week     Gets together: More than three times a week     Attends Church service: Not on file     Active member of club or organization: Not on file     Attends meetings of clubs or organizations: Not on file     Relationship status: Not on file    Intimate partner violence:     Fear of current or ex partner: Not on file     Emotionally abused: Not on file     Physically abused: Not on file     Forced sexual activity: Not on file   Other Topics Concern    Not on file   Social History Narrative    Not on file     Vitals:    08/22/19 1045   BP: 129/84   Pulse: 99     Physical Exam   Constitutional: She appears well-developed and well-nourished  No distress  Cardiovascular: Normal rate, regular rhythm, normal heart sounds and intact distal pulses  Pulmonary/Chest: Effort normal and breath sounds normal  No stridor  No respiratory distress  Abdominal: Soft  Bowel sounds are normal  She exhibits no distension  There is no tenderness  Skin: Skin is warm and dry  She is not diaphoretic  Psychiatric: She has a normal mood and affect   Her behavior is normal

## 2019-08-22 NOTE — H&P (VIEW-ONLY)
Request for sterilization  Scheduled for bilateral salpingectomy 9/13/19  Additional consent form signed 08/08/2019  Preoperative and postoperative expectations reviewed in detail  Risks reviewed-desires to proceed with surgery  RTC 2 weeks for postoperative visit  45 yo P6 for preoperative history and physical   Schedule for bilateral salpingectomy 9/13/19  Confirmed that MARÍA Chu was signed  Review of Systems   Constitutional: Negative for chills, diaphoresis, fatigue and fever  Respiratory: Negative for cough, choking, chest tightness and shortness of breath  Cardiovascular: Negative for chest pain, palpitations and leg swelling  Gastrointestinal: Negative for abdominal pain, constipation, diarrhea, nausea and vomiting  Genitourinary: Negative for menstrual problem, vaginal bleeding, vaginal discharge and vaginal pain  Musculoskeletal: Negative for arthralgias, back pain, myalgias and neck stiffness  Neurological: Negative for dizziness, weakness, light-headedness and headaches  Psychiatric/Behavioral: Negative for self-injury, sleep disturbance and suicidal ideas  The patient is not nervous/anxious        Past Medical History:   Diagnosis Date    Abnormal Pap smear of cervix     HGSIL 2016    Asthma     Cyst in neck at birth    [employer] HPV (human papilloma virus) infection     Hx of blood clots     Intrahepatic cholestasis of pregnancy 5/22/2019    Varicella     as child     Past Surgical History:   Procedure Laterality Date    BRONCHOSCOPY      CERVICAL BIOPSY  W/ LOOP ELECTRODE EXCISION  2016    CYST REMOVAL      LITHOTRIPSY      pf both kidneys, stents placed    TONSILECTOMY AND ADNOIDECTOMY      WISDOM TOOTH EXTRACTION       Current Outpatient Medications on File Prior to Visit   Medication Sig Dispense Refill    acetaminophen (TYLENOL) 325 mg tablet Take 2 tablets (650 mg total) by mouth every 6 (six) hours as needed for headaches (Patient not taking: Reported on 7/18/2019) 30 tablet 0    albuterol (PROVENTIL HFA) 90 mcg/act inhaler Inhale 2 puffs every 6 (six) hours as needed      enoxaparin (LOVENOX) 40 mg/0 4 mL Inject 0 4 mL (40 mg total) under the skin daily for 42 days 16 8 mL 0    enoxaparin (LOVENOX) 40 mg/0 4 mL Inject 0 4 mL (40 mg total) under the skin every 24 hours for 9 doses 3 6 mL 0    ferrous sulfate 324 (65 Fe) mg Take 1 tablet (324 mg total) by mouth 2 (two) times a day before meals (Patient not taking: Reported on 7/18/2019) 30 tablet 2    ibuprofen (MOTRIN) 200 mg tablet Take 3 tablets (600 mg total) by mouth every 6 (six) hours as needed for mild pain      medroxyPROGESTERone (DEPO-PROVERA) 150 mg/mL injection Inject 1 mL (150 mg total) into a muscle every 3 (three) months 1 mL 0    multivitamin (FLINTSTONES) 60 mg chewable tablet Chew 2 tablets daily       No current facility-administered medications on file prior to visit          Allergies   Allergen Reactions    Bee Venom Throat Swelling    Codeine Anaphylaxis and GI Intolerance    Penicillins Anaphylaxis    Pineapple Flavor Throat Swelling    Vancomycin Anaphylaxis    Iodinated Diagnostic Agents Palpitations    Metrizamide Palpitations    Sulfa Antibiotics Rash     Social History     Socioeconomic History    Marital status: /Civil Union     Spouse name: Not on file    Number of children: Not on file    Years of education: Not on file    Highest education level: Not on file   Occupational History    Not on file   Social Needs    Financial resource strain: Somewhat hard    Food insecurity:     Worry: Never true     Inability: Never true    Transportation needs:     Medical: No     Non-medical: No   Tobacco Use    Smoking status: Current Every Day Smoker     Types: Cigarettes    Smokeless tobacco: Never Used   Substance and Sexual Activity    Alcohol use: Yes     Frequency: Monthly or less     Binge frequency: Never    Drug use: No    Sexual activity: Yes     Partners: Male Birth control/protection: None   Lifestyle    Physical activity:     Days per week: Not on file     Minutes per session: Not on file    Stress: Very much   Relationships    Social connections:     Talks on phone: More than three times a week     Gets together: More than three times a week     Attends Worship service: Not on file     Active member of club or organization: Not on file     Attends meetings of clubs or organizations: Not on file     Relationship status: Not on file    Intimate partner violence:     Fear of current or ex partner: Not on file     Emotionally abused: Not on file     Physically abused: Not on file     Forced sexual activity: Not on file   Other Topics Concern    Not on file   Social History Narrative    Not on file     Vitals:    08/22/19 1045   BP: 129/84   Pulse: 99     Physical Exam   Constitutional: She appears well-developed and well-nourished  No distress  Cardiovascular: Normal rate, regular rhythm, normal heart sounds and intact distal pulses  Pulmonary/Chest: Effort normal and breath sounds normal  No stridor  No respiratory distress  Abdominal: Soft  Bowel sounds are normal  She exhibits no distension  There is no tenderness  Skin: Skin is warm and dry  She is not diaphoretic  Psychiatric: She has a normal mood and affect   Her behavior is normal

## 2019-08-22 NOTE — PATIENT INSTRUCTIONS
Salpingectomy   AMBULATORY CARE:   A salpingectomy  is surgery to remove one or both of your fallopian tubes  The fallopian tubes carry eggs from the ovaries to the uterus  They are part of a woman's reproductive system  A salpingectomy may be done to treat an ectopic pregnancy, cancer, endometriosis, or an infection  It may also be done to prevent pregnancy or some types of cancer  How to prepare for a salpingectomy:   · Your healthcare provider will talk to you about how to prepare for surgery  He may tell you not to eat or drink anything after midnight on the day of your surgery  He will tell you what medicines to take or not take on the day of your surgery  You may need to stop taking blood thinners, aspirin, and NSAIDs several days before surgery  This may prevent bleeding before and after surgery  · You may need blood or urine tests before surgery  You may also need an x-ray, CT scan, or ultrasound before surgery  This will help your healthcare provider plan your surgery  You may be given an antibiotic through your IV to help prevent a bacterial infection  You may need to empty your bladder before surgery  This may help your healthcare provider see your reproductive organs more clearly, and prevent your bladder from being injured  What will happen during a salpingectomy:   · You will be given general anesthesia to keep you asleep and free from pain during surgery  Your healthcare provider may remove your fallopian tubes laparoscopically or through an open approach with one larger incision  If done laparoscopically, your healthcare provider will make one or more small incisions in your abdomen  Your healthcare provider will put a laparoscope and other tools into your abdomen through the incisions  A laparoscope is a long metal tube with a light and camera on the end  The abdomen will then be inflated with a gas (carbon dioxide)   This lifts your abdomen away from your organs and gives your healthcare provider more space to work  · In both types of procedures, tools will be used to cut and remove one or both of your fallopian tubes  Your healthcare provider will close the incisions with medical glue, tape, or stitches, and cover them with a bandage  A vaginal pack or sanitary pad may be used to absorb the bleeding  A vaginal pack is a special gauze that is inserted into the vagina and removed before you go home or to a hospital room  What will happen after a salpingectomy:  Healthcare providers will monitor you until you are awake  You may have bleeding and discharge from your vagina for several days  If your surgery was done laparoscopically, you may also feel pain in your shoulder or back  This is caused by the air that is put into your abdomen during laparoscopic surgery  You may be able to go home or you may need to spend the night in the hospital  Walk around as soon as possible after surgery to prevent blood clots  Risks of a salpingectomy:  You may bleed more than expected or get an infection  Your ovaries, uterus, cervix, vagina, intestines, or bladder may be damaged during surgery  You may get a blood clot in your arm or leg  This may become life-threatening  You may have a hard time getting pregnant if your remaining fallopian tube does not work correctly  If both tubes are removed, you may still be at risk for an ectopic pregnancy  Call 911 for any of the following:   · You feel lightheaded, short of breath, and have chest pain  · You cough up blood  · You have trouble breathing  Seek care immediately if:   · Your arm or leg feels warm, tender, and painful  It may look swollen and red  · Blood soaks through your bandage  · Your stitches come apart  · You soak through 1 sanitary pad in 1 hour  · You have trouble urinating or cannot urinate at all  Contact your healthcare provider if:   · You have a fever or chills      · Your wound is red, swollen, or draining pus     · You have pus or a foul-smelling odor coming from your vagina  · Your pain does not get better after you take your medicine  · You have nausea or are vomiting  · Your skin is itchy, swollen, or you have a rash  · You have questions or concerns about your condition or care  Medicines: You may need any of the following:  · Prescription pain medicine  may be given  Ask your healthcare provider how to take this medicine safely  · NSAIDs , such as ibuprofen, help decrease swelling, pain, and fever  NSAIDs can cause stomach bleeding or kidney problems in certain people  If you take blood thinner medicine, always ask your healthcare provider if NSAIDs are safe for you  Always read the medicine label and follow directions  · Take your medicine as directed  Contact your healthcare provider if you think your medicine is not helping or if you have side effects  Tell him or her if you are allergic to any medicine  Keep a list of the medicines, vitamins, and herbs you take  Include the amounts, and when and why you take them  Bring the list or the pill bottles to follow-up visits  Carry your medicine list with you in case of an emergency  Care for your wound as directed:  Ask your healthcare provider when your wound can get wet  Do not take a bath until your healthcare provider says it is okay  Take a shower only  Carefully wash around the wound with soap and water  Let the soap and water gently run over your incision  Do not  scrub your incision  Dry the area and put on new, clean bandages as directed  Change your bandages when they get wet or dirty  If you have strips of medical tape, let them fall off on their own  Activity:  Ask your healthcare provider when you can return to your normal activities  Do not douche, use tampons, or have sex until your healthcare provider says it is okay  These activities may cause infection   Do not exercise or lift anything heavy until your healthcare provider says it is okay  This may put too much stress on your incision  Follow up with your healthcare provider as directed:  Write down your questions so you remember to ask them during your visits  © 2017 [employer] Information is for End User's use only and may not be sold, redistributed or otherwise used for commercial purposes  All illustrations and images included in CareNotes® are the copyrighted property of [employer]  or [employer]y Willis  The above information is an  only  It is not intended as medical advice for individual conditions or treatments  Talk to your doctor, nurse or pharmacist before following any medical regimen to see if it is safe and effective for you

## 2019-09-12 ENCOUNTER — ANESTHESIA EVENT (OUTPATIENT)
Dept: PERIOP | Facility: HOSPITAL | Age: 39
End: 2019-09-12
Payer: COMMERCIAL

## 2019-09-12 RX ORDER — CALCIUM CARBONATE 200(500)MG
1 TABLET,CHEWABLE ORAL DAILY PRN
COMMUNITY

## 2019-09-12 RX ORDER — CETIRIZINE HYDROCHLORIDE 10 MG/1
10 TABLET ORAL DAILY
COMMUNITY

## 2019-09-12 RX ORDER — FEXOFENADINE HCL 180 MG/1
180 TABLET ORAL DAILY
COMMUNITY
End: 2019-09-12 | Stop reason: ALTCHOICE

## 2019-09-12 NOTE — PRE-PROCEDURE INSTRUCTIONS
Pre-Surgery Instructions:   Medication Instructions    acetaminophen (TYLENOL) 325 mg tablet Instructed patient per Anesthesia Guidelines   albuterol (PROVENTIL HFA) 90 mcg/act inhaler Instructed patient per Anesthesia Guidelines   calcium carbonate (TUMS) 500 mg chewable tablet Instructed patient per Anesthesia Guidelines   cetirizine (ZyrTEC) 10 mg tablet Instructed patient per Anesthesia Guidelines   ibuprofen (MOTRIN) 200 mg tablet Instructed patient per Anesthesia Guidelines   medroxyPROGESTERone (DEPO-PROVERA) 150 mg/mL injection Instructed patient per Anesthesia Guidelines    Patient given/ instructed on use of chlorhexidine soap per hospital protocol    Patient instructed to stop all ASA, NSAIDS, vitamins and herbal supplements one week prior to surgery per Dr Marco A Ibrahim

## 2019-09-13 ENCOUNTER — ANESTHESIA (OUTPATIENT)
Dept: PERIOP | Facility: HOSPITAL | Age: 39
End: 2019-09-13
Payer: COMMERCIAL

## 2019-09-13 ENCOUNTER — HOSPITAL ENCOUNTER (OUTPATIENT)
Facility: HOSPITAL | Age: 39
Setting detail: OUTPATIENT SURGERY
Discharge: HOME | End: 2019-09-13
Attending: OBSTETRICS & GYNECOLOGY | Admitting: OBSTETRICS & GYNECOLOGY
Payer: COMMERCIAL

## 2019-09-13 ENCOUNTER — DOCUMENTATION (OUTPATIENT)
Dept: OTHER | Facility: HOSPITAL | Age: 39
End: 2019-09-13

## 2019-09-13 VITALS
OXYGEN SATURATION: 98 % | HEART RATE: 87 BPM | RESPIRATION RATE: 15 BRPM | BODY MASS INDEX: 33.49 KG/M2 | DIASTOLIC BLOOD PRESSURE: 51 MMHG | HEIGHT: 65 IN | SYSTOLIC BLOOD PRESSURE: 99 MMHG | TEMPERATURE: 98.2 F | WEIGHT: 201 LBS

## 2019-09-13 DIAGNOSIS — Z64.1 MULTIPARITY: ICD-10-CM

## 2019-09-13 DIAGNOSIS — Z98.890 POSTOPERATIVE STATE: Primary | ICD-10-CM

## 2019-09-13 LAB
EXT PREGNANCY TEST URINE: NEGATIVE
EXT. CONTROL: NORMAL

## 2019-09-13 PROCEDURE — 88302 TISSUE EXAM BY PATHOLOGIST: CPT | Performed by: PATHOLOGY

## 2019-09-13 PROCEDURE — 58661 LAPAROSCOPY REMOVE ADNEXA: CPT | Performed by: OBSTETRICS & GYNECOLOGY

## 2019-09-13 PROCEDURE — 81025 URINE PREGNANCY TEST: CPT | Performed by: ANESTHESIOLOGY

## 2019-09-13 RX ORDER — ONDANSETRON 2 MG/ML
4 INJECTION INTRAMUSCULAR; INTRAVENOUS ONCE AS NEEDED
Status: DISCONTINUED | OUTPATIENT
Start: 2019-09-13 | End: 2019-09-13 | Stop reason: HOSPADM

## 2019-09-13 RX ORDER — ACETAMINOPHEN 325 MG/1
650 TABLET ORAL EVERY 6 HOURS PRN
Status: DISCONTINUED | OUTPATIENT
Start: 2019-09-13 | End: 2019-09-13 | Stop reason: HOSPADM

## 2019-09-13 RX ORDER — ALBUTEROL SULFATE 90 UG/1
2 AEROSOL, METERED RESPIRATORY (INHALATION) EVERY 6 HOURS PRN
Status: DISCONTINUED | OUTPATIENT
Start: 2019-09-13 | End: 2019-09-13 | Stop reason: HOSPADM

## 2019-09-13 RX ORDER — ROCURONIUM BROMIDE 10 MG/ML
INJECTION, SOLUTION INTRAVENOUS AS NEEDED
Status: DISCONTINUED | OUTPATIENT
Start: 2019-09-13 | End: 2019-09-13 | Stop reason: SURG

## 2019-09-13 RX ORDER — OXYCODONE HYDROCHLORIDE 5 MG/1
5 TABLET ORAL EVERY 4 HOURS PRN
Qty: 6 TABLET | Refills: 0
Start: 2019-09-13 | End: 2019-09-17

## 2019-09-13 RX ORDER — OXYCODONE HYDROCHLORIDE 5 MG/1
5 TABLET ORAL EVERY 4 HOURS PRN
Status: DISCONTINUED | OUTPATIENT
Start: 2019-09-13 | End: 2019-09-13 | Stop reason: HOSPADM

## 2019-09-13 RX ORDER — FENTANYL CITRATE 50 UG/ML
INJECTION, SOLUTION INTRAMUSCULAR; INTRAVENOUS AS NEEDED
Status: DISCONTINUED | OUTPATIENT
Start: 2019-09-13 | End: 2019-09-13 | Stop reason: SURG

## 2019-09-13 RX ORDER — MIDAZOLAM HYDROCHLORIDE 1 MG/ML
0.5 INJECTION INTRAMUSCULAR; INTRAVENOUS ONCE
Status: COMPLETED | OUTPATIENT
Start: 2019-09-13 | End: 2019-09-13

## 2019-09-13 RX ORDER — KETOROLAC TROMETHAMINE 30 MG/ML
INJECTION, SOLUTION INTRAMUSCULAR; INTRAVENOUS AS NEEDED
Status: DISCONTINUED | OUTPATIENT
Start: 2019-09-13 | End: 2019-09-13 | Stop reason: SURG

## 2019-09-13 RX ORDER — ONDANSETRON 2 MG/ML
INJECTION INTRAMUSCULAR; INTRAVENOUS AS NEEDED
Status: DISCONTINUED | OUTPATIENT
Start: 2019-09-13 | End: 2019-09-13 | Stop reason: SURG

## 2019-09-13 RX ORDER — IBUPROFEN 200 MG
600 TABLET ORAL EVERY 6 HOURS PRN
Refills: 0
Start: 2019-09-13

## 2019-09-13 RX ORDER — MIDAZOLAM HYDROCHLORIDE 1 MG/ML
INJECTION INTRAMUSCULAR; INTRAVENOUS AS NEEDED
Status: DISCONTINUED | OUTPATIENT
Start: 2019-09-13 | End: 2019-09-13 | Stop reason: SURG

## 2019-09-13 RX ORDER — BUPIVACAINE HYDROCHLORIDE 2.5 MG/ML
INJECTION, SOLUTION EPIDURAL; INFILTRATION; INTRACAUDAL AS NEEDED
Status: DISCONTINUED | OUTPATIENT
Start: 2019-09-13 | End: 2019-09-13 | Stop reason: HOSPADM

## 2019-09-13 RX ORDER — FENTANYL CITRATE/PF 50 MCG/ML
50 SYRINGE (ML) INJECTION
Status: DISCONTINUED | OUTPATIENT
Start: 2019-09-13 | End: 2019-09-13 | Stop reason: HOSPADM

## 2019-09-13 RX ORDER — MAGNESIUM HYDROXIDE 1200 MG/15ML
LIQUID ORAL AS NEEDED
Status: DISCONTINUED | OUTPATIENT
Start: 2019-09-13 | End: 2019-09-13 | Stop reason: HOSPADM

## 2019-09-13 RX ORDER — PROPOFOL 10 MG/ML
INJECTION, EMULSION INTRAVENOUS AS NEEDED
Status: DISCONTINUED | OUTPATIENT
Start: 2019-09-13 | End: 2019-09-13 | Stop reason: SURG

## 2019-09-13 RX ORDER — SUCCINYLCHOLINE/SOD CL,ISO/PF 100 MG/5ML
SYRINGE (ML) INTRAVENOUS AS NEEDED
Status: DISCONTINUED | OUTPATIENT
Start: 2019-09-13 | End: 2019-09-13 | Stop reason: SURG

## 2019-09-13 RX ORDER — LIDOCAINE HYDROCHLORIDE 10 MG/ML
INJECTION, SOLUTION INFILTRATION; PERINEURAL AS NEEDED
Status: DISCONTINUED | OUTPATIENT
Start: 2019-09-13 | End: 2019-09-13 | Stop reason: SURG

## 2019-09-13 RX ORDER — OXYCODONE HYDROCHLORIDE 5 MG/1
10 TABLET ORAL EVERY 4 HOURS PRN
Status: DISCONTINUED | OUTPATIENT
Start: 2019-09-13 | End: 2019-09-13 | Stop reason: HOSPADM

## 2019-09-13 RX ORDER — HEPARIN SODIUM 5000 [USP'U]/ML
5000 INJECTION, SOLUTION INTRAVENOUS; SUBCUTANEOUS ONCE
Status: COMPLETED | OUTPATIENT
Start: 2019-09-13 | End: 2019-09-13

## 2019-09-13 RX ORDER — DEXAMETHASONE SODIUM PHOSPHATE 10 MG/ML
INJECTION, SOLUTION INTRAMUSCULAR; INTRAVENOUS AS NEEDED
Status: DISCONTINUED | OUTPATIENT
Start: 2019-09-13 | End: 2019-09-13 | Stop reason: SURG

## 2019-09-13 RX ORDER — ONDANSETRON 2 MG/ML
4 INJECTION INTRAMUSCULAR; INTRAVENOUS EVERY 6 HOURS PRN
Status: DISCONTINUED | OUTPATIENT
Start: 2019-09-13 | End: 2019-09-13 | Stop reason: HOSPADM

## 2019-09-13 RX ORDER — SODIUM CHLORIDE 9 MG/ML
125 INJECTION, SOLUTION INTRAVENOUS CONTINUOUS
Status: DISCONTINUED | OUTPATIENT
Start: 2019-09-13 | End: 2019-09-13 | Stop reason: HOSPADM

## 2019-09-13 RX ORDER — ACETAMINOPHEN 325 MG/1
650 TABLET ORAL EVERY 6 HOURS PRN
Qty: 30 TABLET | Refills: 0
Start: 2019-09-13

## 2019-09-13 RX ORDER — IBUPROFEN 600 MG/1
600 TABLET ORAL EVERY 6 HOURS PRN
Status: DISCONTINUED | OUTPATIENT
Start: 2019-09-13 | End: 2019-09-13 | Stop reason: HOSPADM

## 2019-09-13 RX ORDER — OXYCODONE HYDROCHLORIDE 5 MG/1
5 TABLET ORAL EVERY 4 HOURS PRN
Qty: 6 TABLET | Refills: 0 | Status: SHIPPED | OUTPATIENT
Start: 2019-09-13 | End: 2019-09-13 | Stop reason: SDUPTHER

## 2019-09-13 RX ADMIN — ONDANSETRON 4 MG: 2 INJECTION INTRAMUSCULAR; INTRAVENOUS at 07:40

## 2019-09-13 RX ADMIN — DEXAMETHASONE SODIUM PHOSPHATE 8 MG: 10 INJECTION, SOLUTION INTRAMUSCULAR; INTRAVENOUS at 07:40

## 2019-09-13 RX ADMIN — SODIUM CHLORIDE 125 ML/HR: 0.9 INJECTION, SOLUTION INTRAVENOUS at 06:36

## 2019-09-13 RX ADMIN — IBUPROFEN 600 MG: 600 TABLET, FILM COATED ORAL at 12:13

## 2019-09-13 RX ADMIN — FENTANYL CITRATE 50 MCG: 50 INJECTION, SOLUTION INTRAMUSCULAR; INTRAVENOUS at 08:03

## 2019-09-13 RX ADMIN — PROPOFOL 200 MG: 10 INJECTION, EMULSION INTRAVENOUS at 07:40

## 2019-09-13 RX ADMIN — SODIUM CHLORIDE: 0.9 INJECTION, SOLUTION INTRAVENOUS at 08:03

## 2019-09-13 RX ADMIN — LIDOCAINE HYDROCHLORIDE 100 MG: 10 INJECTION, SOLUTION INFILTRATION; PERINEURAL at 07:40

## 2019-09-13 RX ADMIN — KETOROLAC TROMETHAMINE 30 MG: 30 INJECTION, SOLUTION INTRAMUSCULAR at 08:40

## 2019-09-13 RX ADMIN — FENTANYL CITRATE 50 MCG: 50 INJECTION INTRAMUSCULAR; INTRAVENOUS at 10:16

## 2019-09-13 RX ADMIN — MIDAZOLAM 0.5 MG: 1 INJECTION INTRAMUSCULAR; INTRAVENOUS at 09:50

## 2019-09-13 RX ADMIN — MIDAZOLAM 4 MG: 1 INJECTION INTRAMUSCULAR; INTRAVENOUS at 07:35

## 2019-09-13 RX ADMIN — HEPARIN SODIUM 5000 UNITS: 5000 INJECTION, SOLUTION INTRAVENOUS; SUBCUTANEOUS at 07:55

## 2019-09-13 RX ADMIN — Medication 100 MG: at 07:40

## 2019-09-13 RX ADMIN — ROCURONIUM BROMIDE 20 MG: 100 INJECTION, SOLUTION INTRAVENOUS at 07:47

## 2019-09-13 RX ADMIN — SODIUM CHLORIDE 125 ML/HR: 0.9 INJECTION, SOLUTION INTRAVENOUS at 09:48

## 2019-09-13 RX ADMIN — FENTANYL CITRATE 50 MCG: 50 INJECTION, SOLUTION INTRAMUSCULAR; INTRAVENOUS at 07:40

## 2019-09-13 RX ADMIN — FENTANYL CITRATE 50 MCG: 50 INJECTION INTRAMUSCULAR; INTRAVENOUS at 09:37

## 2019-09-13 NOTE — PROGRESS NOTES
D/C instructions reviewed w/ pt & spouse, verbalized understanding  3 trocar sites across abd remain CD&I closed w/ skin glue  Given extra gauze for home prn  Peripad w/ small amount brownish/pink drainage, given extra home  PO fluids encouraged  Pt stated Dr Letty Thompson sent Roxicodone Rx to Pharmacy  Pt remains calm & pleasant since medicated for anxiety in PACU  Reminder to make Dermatology appt for mole on leg

## 2019-09-13 NOTE — ANESTHESIA PREPROCEDURE EVALUATION
Review of Systems/Medical History  Patient summary reviewed  Chart reviewed  No history of anesthetic complications     Cardiovascular  Negative cardio ROS DVT  Comment: Past hx of DVT patient states she redused blood thinners at the time,  Pulmonary  Asthma Last rescue: < 1 year ago , Shortness of breath, Sleep apnea ,   Comment: Branchial cleft cyst syndrome    Probable MIC referred to have sleep study     GI/Hepatic    Liver disease ,   Comment: Intrahepatic cholestasis of pregnancy     Negative  ROS Kidney stones,        Endo/Other  History of thyroid disease ,   Comment: Thyroid cyst    GYN  Negative gynecology ROS          Hematology  Negative hematology ROS      Musculoskeletal  Negative musculoskeletal ROS        Neurology  Negative neurology ROS      Psychology   Anxiety,              Physical Exam    Airway  Comment: Looks anterior, prominent overbite  Mallampati score: III  TM Distance: <3 FB  Neck ROM: full     Dental   No notable dental hx     Cardiovascular  Comment: Negative ROS, Rhythm: regular, Rate: normal, Cardiovascular exam normal    Pulmonary  Pulmonary exam normal Breath sounds clear to auscultation,     Other Findings        Anesthesia Plan  ASA Score- 2     Anesthesia Type- general with ASA Monitors  Additional Monitors:   Airway Plan: ETT  Plan Factors- Patient instructed to abstain from smoking on day of procedure  Patient smoked on day of surgery  Induction- intravenous  Postoperative Plan- Plan for postoperative opioid use  Planned trial extubation    Informed Consent- Anesthetic plan and risks discussed with patient

## 2019-09-13 NOTE — PROGRESS NOTES
Pt crying hysterically in PACU; after multiple attempts of asking patient why she's crying; patient would not respond to RN  RN finally got a response from patient regarding her pain

## 2019-09-13 NOTE — INTERVAL H&P NOTE
H&P reviewed  After examining the patient I find no changes in the patients condition since the H&P had been written      Vitals:    09/13/19 0612   BP: 109/61   Pulse: 85   Resp: 16   Temp: 97 5 °F (36 4 °C)   SpO2: 99%     Jamie Choi MD  OB/GYN  9/13/2019 7:16 AM

## 2019-09-13 NOTE — DISCHARGE INSTRUCTIONS
During your procedure, it was noted you have a mole on your left inner thigh  We would recommend you see a dermatologist for evaluation of the mole  Salpingectomy   WHAT YOU NEED TO KNOW:   A salpingectomy is surgery to remove one or both of your fallopian tubes  The fallopian tubes carry eggs from the ovaries to the uterus  They are part of a woman's reproductive system  A salpingectomy may be done to treat an ectopic pregnancy, cancer, endometriosis, or an infection  It may also be done to prevent pregnancy or some types of cancer  DISCHARGE INSTRUCTIONS:   Call 911 for any of the following:   · You feel lightheaded, short of breath, and have chest pain  · You cough up blood  · You have trouble breathing  Seek care immediately if:   · Your arm or leg feels warm, tender, and painful  It may look swollen and red  · Blood soaks through your bandage  · Your stitches come apart  · You soak through 1 sanitary pad in 1 hour  · You have trouble urinating or cannot urinate at all  Contact your healthcare provider if:   · You have a fever or chills  · Your wound is red, swollen, or draining pus  · You have pus or a foul-smelling odor coming from your vagina  · Your pain does not get better after you take your medicine  · You have nausea or are vomiting  · Your skin is itchy, swollen, or you have a rash  · You have questions or concerns about your condition or care  Medicines: You may need any of the following:  · Prescription pain medicine  may be given  Ask your healthcare provider how to take this medicine safely  Please take this medication as needed for pain that is not controlled by tylenol and ibuprofen  If you do not use all the medication prescribed to you, you may return it at most pharmacies safely  · NSAIDs , such as ibuprofen, help decrease swelling, pain, and fever  NSAIDs can cause stomach bleeding or kidney problems in certain people   If you take blood thinner medicine, always ask your healthcare provider if NSAIDs are safe for you  Always read the medicine label and follow directions  · Take your medicine as directed  Contact your healthcare provider if you think your medicine is not helping or if you have side effects  Tell him or her if you are allergic to any medicine  Keep a list of the medicines, vitamins, and herbs you take  Include the amounts, and when and why you take them  Bring the list or the pill bottles to follow-up visits  Carry your medicine list with you in case of an emergency  Care for your wound as directed:  Ask your healthcare provider when your wound can get wet  Do not take a bath until your healthcare provider says it is okay  Take a shower only  Carefully wash around the wound with soap and water  Let the soap and water gently run over your incision  Do not scrub your incision  Dry the area and put on new, clean bandages as directed  Change your bandages when they get wet or dirty  If you have strips of medical tape, let them fall off on their own  Activity:  You may return to your regular activities as tolerated  Please do not lift more than 10lbs for the next two weeks  You may exercise as tolerated, but avoid strenuous activity for the first few weeks  Follow up with your healthcare provider as directed:  Write down your questions so you remember to ask them during your visits  © 2017 [address] Information is for End User's use only and may not be sold, redistributed or otherwise used for commercial purposes  All illustrations and images included in CareNotes® are the copyrighted property of A D A M , Inc  or Fredy Willis  The above information is an  only  It is not intended as medical advice for individual conditions or treatments  Talk to your doctor, nurse or pharmacist before following any medical regimen to see if it is safe and effective for you

## 2019-09-13 NOTE — INTERVAL H&P NOTE
H&P reviewed  After examining the patient I find no changes in the patients condition since the H&P had been written  Vitals:    09/13/19 0612   BP: 109/61   Pulse: 85   Resp: 16   Temp: 97 5 °F (36 4 °C)   SpO2: 99%     Given DVT history (pregnancy related and thought to be due to immobility) recommend chemoprophylaxis with Heparin 5,000 units subcutaneous  Pt understands and agrees

## 2019-09-13 NOTE — OP NOTE
OPERATIVE REPORT  PATIENT NAME: Gabriella Berkowitz    :  1980  MRN: [id number]  Pt Location: [facility] OR ROOM 02    SURGERY DATE: 2019    Surgeon(s) and Role:     * José Steele MD - Primary     * Aaliyah Crystal MD - Assisting  Joseph Sosa MD- Assisting    Preop Diagnosis:  Multiparity [Z64 1]  Desire for sterilization    Post-Op Diagnosis Codes:     * Multiparity [Z64 1]    Procedure(s) (LRB):  SALPINGECTOMY, LAPAROSCOPIC (Bilateral)    Specimen(s):  ID Type Source Tests Collected by Time Destination   1 :  Tissue Fallopian Tubes, Bilateral TISSUE EXAM José Steele MD 2019 [id number]        Estimated Blood Loss:   15 mL    Drains:  * No LDAs found *    Anesthesia Type:   General    Operative Indications:  Multiparity [Z64 1]  Desire for sterilization    Operative Findings:  Normal appearing external genitalia, normal appearing vaginal mucosa and paracervix   Small, mobile anteverted uterus  Grossly normal uterus, tubes, and ovaries  Grossly normal appendix  Abdominal survey completed without evidence of injury or pathology  Left inner thigh dark 1x1cm raised nevus  Small abrasion on anterior abdominal wall approx 5 cm below umbilicus, 2 cm right of midline, present on admission    Complications:   None    Procedure and Technique:  Brief History    All risks, benefits, and alternatives to the procedure were discussed with the patient and she had the opportunity to ask questions  The risk of regret of procedure was also addressed with the patient and options for LARC was discussed  Patient expressed desire to continue with tubal sterilization  Informed consent was obtained  Description of Procedure  Patient was taken to the operating room  General endotracheal anesthesia (GET) was administered and the patient was positioned on the OR table in the dorsal lithotomy position   All pressure points were padded and a quintin hugger was placed to maintain control of core body temperature  A bimanual exam was performed and the uterus was noted to be anteverted, normal in size and consistency with no palpable adnexal masses or fullness  Left inner thigh noted to have nevus as mentioned in operative findings  The patient was prepped and draped in the usual sterile fashion with chloroprep on the abdomen and on the vagina and perineum  Heparin 5,000 units subcutaneous was administered prior to incision  Operative Technique    A time out was performed to confirm correct patient and correct procedure  A straight catheter was introduced into the bladder, which was drained of 200cc of clear yellow urine  A speculum was inserted into the vagina and used to visualize the anterior lip of the cervix, which was then grasped with a single toothed tenaculum  A cone uterine manipulator was inserted into the cervix and secured to the tenaculum  The speculum was removed from the vagina  Sterile gloves were then exchanged and attention was turned to the abdomen  A 5mm incision was made at the inferior edge of the umbilicus for introduction of a 5mm trocar  Trocar was introduced under direct visualization  Pneumoperitoneum was then established to a maximum of 15mmHg  The entire abdomen and pelvis was inspected and there was no evidence of injury to bowel, bladder, vasculature, or other structures  Normal appendix visualized  Attention was then turned to the pelvis  Patient was placed in Trendelenburg and the uterus was elevated to visualize the fallopian tubes  There were noted to be grossly normal tubes and ovaries bilaterally  Two additional port sites were selected in the left and right lower abdomen approximately 2cm superior and medial to the iliac crests  A 5mm incision was made for introduction of a 5mm trocar under direct visualization at each site  A [place] grasper was inserted through this port and used to visualize the fimbriated ends of the tubes      The right fallopian tube was grasped at its fimbriated end with a blunt grasper and elevated to visualize the mesosalpinx  The Enseal device was used to ligate along the mesosalpinx, working proximally and taking care to avoid ovarian vasculature  Approximately 2cm from the cornua, the Enseal was used to amputate fallopian tube  This was then withdrawn from the abdominal cavity and sent for pathology  Attention was then turned to the contralateral tube, which was amputated in similar fashion  Good hemostasis was confirmed following salpingectomy  Following salpingectomy, pneumoperitoneum was allowed to escape  Adequate hemostasis was visualized  The inferior trocars were removed under direct visualization  The laparoscope was withdrawn from the abdomen, followed by its trocar sleeve at the umbilicus  Skin incisions were closed with running absorbable suture 4-0 Monocryl  Attention was turned to the vagina  A speculum was reinserted into the vagina and the uterine manipulator was withdrawn  Single toothed tenaculum was removed from the anterior lip of the cervix  Some bleeding noted from cervix, ring forceps applied and Monsels solution placed, with subsequent hemostasis noted  This was limited to approximately 15 mL of bleeding  Speculum was then removed from the vagina  At the conclusion of the procedure, all needle, sponge, and instrument counts were noted to be correct x2  Patient tolerated the procedure well and was transferred to PACU in stable condition prior to discharge with follow up in 1-2 weeks  Dr Teresita Mccarthy and Dr Cristina Abdi were present and participated in all key portions of the case  Patient Disposition:  PACU     SIGNATURE: Eddie Golver MD  DATE: September 13, 2019  TIME: 8:57 AM    I was the attending physician and I was present for the entire procedure  I agree with the note by Dr Nicola Babcock, above       Souleymane Houser MD  OB/GYN  9/13/2019 4:15 PM

## 2019-09-13 NOTE — PROGRESS NOTES
Pt is resting comfortably in bed- sleeping, vitals are stable  Surgical sites are C/D/I  Ice applied

## 2019-09-16 ENCOUNTER — TELEPHONE (OUTPATIENT)
Dept: OTHER | Facility: HOSPITAL | Age: 39
End: 2019-09-16

## 2019-09-16 NOTE — TELEPHONE ENCOUNTER
Patient called stating that she had a brown clump come out of her vagina  She was advised that Monsel's solution was utilized during her surgery and that her "clump" description matches that of normal discharge expected after Monsel's  She was reassured and all questions were answered  Cortes Ellison MD  OB/GYN  9/16/2019  5:24 PM

## 2019-10-03 ENCOUNTER — OFFICE VISIT (OUTPATIENT)
Dept: OBGYN CLINIC | Facility: CLINIC | Age: 39
End: 2019-10-03

## 2019-10-03 VITALS — BODY MASS INDEX: 34.05 KG/M2 | SYSTOLIC BLOOD PRESSURE: 117 MMHG | WEIGHT: 204.6 LBS | DIASTOLIC BLOOD PRESSURE: 82 MMHG

## 2019-10-03 DIAGNOSIS — Z90.79 STATUS POST BILATERAL SALPINGECTOMY: ICD-10-CM

## 2019-10-03 DIAGNOSIS — Z09 POSTOPERATIVE EXAMINATION: Primary | ICD-10-CM

## 2019-10-03 PROCEDURE — 99213 OFFICE O/P EST LOW 20 MIN: CPT | Performed by: OBSTETRICS & GYNECOLOGY

## 2019-10-03 NOTE — PROGRESS NOTES
Assessment/Plan:      Diagnoses and all orders for this visit:    Postoperative examination    Status post bilateral salpingectomy  - Pt doing well  Pain controlled without analgesics    - Incisions are well healed, C/D/I  - Advised that irregular bleeding she experienced was most-likely related to Depo side effect  Pt advised her pap smear performed 7/19/19 was NILM and HPV negative  Will RTO in 1 year for GYN exam  Due for repeat pap screening 7/2022  Subjective:     Patient ID: Chrissie Zamudio is a 44 y o  female  HPI  Pt is a [id number] s/p bilateral salpingectomy on 9/13/19  She delivered Di/Di twins on 5/20/19 and received the depo provera injection immediately postpartum  She reports she had irregular bleeding until last month, but since then her bleeding has resolved  She has not had a regular period yet  She denies any pain and incisions are healing well  She is currently busy with her new [address] that her and her  started 6 months ago  She has no complaints today  Review of Systems   Constitutional: Negative for chills and fever  Respiratory: Negative for shortness of breath  Cardiovascular: Negative for chest pain  Gastrointestinal: Negative for abdominal pain, constipation, nausea and vomiting  Objective:     Physical Exam   Constitutional: She is oriented to person, place, and time  She appears well-developed and well-nourished  No distress  HENT:   Head: Normocephalic and atraumatic  Cardiovascular: Normal rate, regular rhythm and normal heart sounds  Exam reveals no gallop and no friction rub  No murmur heard  Pulmonary/Chest: Effort normal and breath sounds normal  No stridor  No respiratory distress  She has no wheezes  She has no rales  Abdominal: Soft  Bowel sounds are normal  She exhibits no distension and no mass  There is no tenderness  There is no rebound and no guarding         3 incision sites area C/D/I without erythema, induration, drainage  Musculoskeletal: Normal range of motion  She exhibits no edema, tenderness or deformity  Neurological: She is alert and oriented to person, place, and time  Skin: She is not diaphoretic  Psychiatric: She has a normal mood and affect  Her behavior is normal  Judgment and thought content normal    Vitals reviewed          Valencia Saez MD  OBGYN, PGY-3  10/3/2019 2:05 PM

## 2021-10-06 ENCOUNTER — TELEPHONE (OUTPATIENT)
Dept: OBGYN CLINIC | Facility: CLINIC | Age: 41
End: 2021-10-06

## 2021-10-06 DIAGNOSIS — N92.6 MISSED PERIOD: Primary | ICD-10-CM

## 2021-10-07 ENCOUNTER — APPOINTMENT (OUTPATIENT)
Dept: LAB | Facility: HOSPITAL | Age: 41
End: 2021-10-07

## 2021-10-07 DIAGNOSIS — N92.6 MISSED PERIOD: ICD-10-CM

## 2021-10-07 LAB — B-HCG SERPL-ACNC: <2 MIU/ML

## 2021-10-07 PROCEDURE — 84702 CHORIONIC GONADOTROPIN TEST: CPT

## 2021-10-07 PROCEDURE — 36415 COLL VENOUS BLD VENIPUNCTURE: CPT

## 2021-10-15 ENCOUNTER — TELEPHONE (OUTPATIENT)
Dept: PEDIATRICS CLINIC | Facility: CLINIC | Age: 41
End: 2021-10-15

## 2023-04-27 ENCOUNTER — APPOINTMENT (OUTPATIENT)
Dept: RADIOLOGY | Facility: HOSPITAL | Age: 43
End: 2023-04-27

## 2023-04-27 ENCOUNTER — HOSPITAL ENCOUNTER (EMERGENCY)
Facility: HOSPITAL | Age: 43
Discharge: HOME | End: 2023-04-27
Attending: EMERGENCY MEDICINE | Admitting: EMERGENCY MEDICINE

## 2023-04-27 VITALS
OXYGEN SATURATION: 99 % | DIASTOLIC BLOOD PRESSURE: 91 MMHG | SYSTOLIC BLOOD PRESSURE: 140 MMHG | TEMPERATURE: 98.3 F | HEIGHT: 65 IN | BODY MASS INDEX: 33.99 KG/M2 | HEART RATE: 120 BPM | RESPIRATION RATE: 20 BRPM | WEIGHT: 204 LBS

## 2023-04-27 DIAGNOSIS — Z04.9 SUSPECTED CONDITION NOT FOUND: ICD-10-CM

## 2023-04-27 DIAGNOSIS — R09.89 GLOBUS SENSATION: Primary | ICD-10-CM

## 2023-04-28 NOTE — ED PROVIDER NOTES
"History  Chief Complaint   Patient presents with   • Foreign Body in Throat     Patient presents to ED with irritation in throat, patient reports eating picked around 1600 today when she felt pickle get stuck, attempted to drink soda which helped :\"move it alittle\" but still feels there is something there  Patient able to keep secretions down, airway patent      HPI    80-year-old female called EMS resenting to the emergency department for irritation of the back of the throat  She states she ate a piece of a pickle and it got stuck in the back of her throat and still feels that there is a small toothpick on the back of her throat  Pacifically denies any difficulty managing secretions, nausea vomiting, respiratory distress, coughing, stridor, wheezing or otherwise noisy breathing  Does have some element of globus sensation and came to emergency department for evaluation  History provided by patient and EMS  Specifically denies any distress  Prior to Admission Medications   Prescriptions Last Dose Informant Patient Reported?  Taking?   acetaminophen (TYLENOL) 325 mg tablet   No No   Sig: Take 2 tablets (650 mg total) by mouth every 6 (six) hours as needed for mild pain or headaches   calcium carbonate (TUMS) 500 mg chewable tablet   Yes No   Sig: Chew 1 tablet daily as needed for indigestion or heartburn   cetirizine (ZyrTEC) 10 mg tablet   Yes No   Sig: Take 10 mg by mouth daily   ibuprofen (MOTRIN) 200 mg tablet   No No   Sig: Take 3 tablets (600 mg total) by mouth every 6 (six) hours as needed for moderate pain   medroxyPROGESTERone (DEPO-PROVERA) 150 mg/mL injection   No No   Sig: Inject 1 mL (150 mg total) into a muscle every 3 (three) months      Facility-Administered Medications: None       Past Medical History:   Diagnosis Date   • Abnormal Pap smear of cervix     HGSIL 2016   • Anesthesia     \"woke up with 4 procedures/lithotripsy(coded)/bronchoscopy/ cyst removal and wisdom teeth \" \"also high anxiety\" " "  • Anxiety     panic attack on occas/gets worked up   • Asthma     \"controlled'   • Cut of upper extremity     arms and legs from landscaping yest 9/11/pt to notify Dr Pruitt [facility] office today   • Cyst in neck at birth    • Dental crowns present    • DVT (deep vein thrombosis) in pregnancy     1999 in right arm and 2008 left leg   • Environmental and seasonal allergies    • Hemorrhoids during pregnancy    • History of kidney stones    • History of palpitations     \"with anxiety\"   • HPV (human papilloma virus) infection    • Hx of blood clots     during 2 pregnancies 1999/ and 2008/no longer on lovenox   • Intrahepatic cholestasis of pregnancy 5/22/2019   • Low back pain     occas   • Lumbar herniated disc    • Shortness of breath     sometimes   • Thyroid cyst    • Varicella     as child   • Wears glasses    • Wears partial dentures     upper       Past Surgical History:   Procedure Laterality Date   • BRONCHOSCOPY     • CERVICAL BIOPSY  W/ LOOP ELECTRODE EXCISION  2016   • CYST REMOVAL     • LITHOTRIPSY      pf both kidneys, stents placed   • AZ LAPAROSCOPY W/RMVL ADNEXAL STRUCTURES Bilateral 9/13/2019    Procedure: SALPINGECTOMY, LAPAROSCOPIC;  Surgeon: Vinicius Bautista MD;  Location: [facility];  Service: Gynecology   • TONSILECTOMY AND ADNOIDECTOMY     • WISDOM TOOTH EXTRACTION         Family History   Problem Relation Age of Onset   • Leukemia Mother    • No Known Problems Father    • No Known Problems Sister      I have reviewed and agree with the history as documented  E-Cigarette/Vaping     E-Cigarette/Vaping Substances   • Nicotine No    • THC No    • CBD No    • Flavoring No    • Other No    • Unknown No      Social History     Tobacco Use   • Smoking status: Every Day     Types: Cigarettes   • Smokeless tobacco: Never   Substance Use Topics   • Alcohol use: Yes   • Drug use: No       Review of Systems   Constitutional: Negative for chills and fever     HENT: Negative for ear pain, nosebleeds, " postnasal drip, rhinorrhea and sore throat  Globus sensation in posterior throat   Eyes: Negative for pain and visual disturbance  Respiratory: Negative for cough, choking, chest tightness, shortness of breath, wheezing and stridor  Cardiovascular: Negative for chest pain and palpitations  Gastrointestinal: Negative for abdominal pain and vomiting  Genitourinary: Negative for dysuria and hematuria  Musculoskeletal: Negative for arthralgias and back pain  Skin: Negative for color change and rash  Neurological: Negative for seizures and syncope  All other systems reviewed and are negative  Physical Exam  Physical Exam  Vitals and nursing note reviewed  Constitutional:       General: She is not in acute distress  Appearance: She is well-developed  HENT:      Head: Normocephalic and atraumatic  Eyes:      Conjunctiva/sclera: Conjunctivae normal    Cardiovascular:      Rate and Rhythm: Normal rate and regular rhythm  Heart sounds: No murmur heard  Pulmonary:      Effort: Pulmonary effort is normal  No respiratory distress  Breath sounds: Normal breath sounds  Abdominal:      Palpations: Abdomen is soft  Tenderness: There is no abdominal tenderness  Musculoskeletal:         General: No swelling  Cervical back: Neck supple  Skin:     General: Skin is warm and dry  Capillary Refill: Capillary refill takes less than 2 seconds  Neurological:      Mental Status: She is alert     Psychiatric:         Mood and Affect: Mood normal          Vital Signs  ED Triage Vitals [04/27/23 1636]   Temperature Pulse Respirations Blood Pressure SpO2   98 3 °F (36 8 °C) (!) 120 20 140/91 99 %      Temp Source Heart Rate Source Patient Position - Orthostatic VS BP Location FiO2 (%)   Temporal Monitor Sitting Right arm --      Pain Score       --           Vitals:    04/27/23 1636   BP: 140/91   Pulse: (!) 120   Patient Position - Orthostatic VS: Sitting         Visual Acuity      ED Medications  Medications - No data to display    Diagnostic Studies  Results Reviewed     None                 XR neck soft tissue   ED Interpretation by Maurice Hoang PA-C (04/28 0029)   No obvious foreign body  Patent airway                 Procedures  Procedures         ED Course  ED Course as of 04/28/23 0032   Thu Apr 27, 2023   1815 Pt eloped from ED prior to being able to discuss results with the patient to reevaluate  Left without being seen after provider exam but prior to reevaluation and discussion of x-ray findings  SBIRT 20yo+    Flowsheet Row Most Recent Value   Initial Alcohol Screen: US AUDIT-C     1  How often do you have a drink containing alcohol? 0 Filed at: 04/27/2023 1648   2  How many drinks containing alcohol do you have on a typical day you are drinking? 0 Filed at: 04/27/2023 1648   3b  FEMALE Any Age, or MALE 65+: How often do you have 4 or more drinks on one occassion? 0 Filed at: 04/27/2023 1648   Audit-C Score 0 Filed at: 04/27/2023 1648   CLEMENTE: How many times in the past year have you    Used an illegal drug or used a prescription medication for non-medical reasons? Never Filed at: 04/27/2023 1648                    Medical Decision Making  Patient with globus sensation likely due to posterior pharyngeal irritation from prior event of food bolus lodged abnormally and hypopharynx  Patient was stable on initial examination and was referred back to emergency department deemed to be stable, however, prior to discussion of x-rays and findings did elope from the emergency department  Globus sensation: acute illness or injury  Suspected condition not found: acute illness or injury  Amount and/or Complexity of Data Reviewed  Radiology: ordered and independent interpretation performed            Disposition  Final diagnoses:   Globus sensation   Suspected condition not found     Time reflects when diagnosis was documented in both MDM as applicable and the Disposition within this note     Time User Action Codes Description Comment    4/27/2023  6:26 PM Kareen Kensington Add [R09 89] Globus sensation     4/27/2023  6:27 PM Kareen Kensington Add [Z04 9] Suspected condition not found       ED Disposition     ED Disposition   Left from Room after Provider Exam    Condition   --    Date/Time   Thu Apr 27, 2023  6:26 PM    Comment   --         Follow-up Information    None         Discharge Medication List as of 4/27/2023  6:28 PM      CONTINUE these medications which have NOT CHANGED    Details   acetaminophen (TYLENOL) 325 mg tablet Take 2 tablets (650 mg total) by mouth every 6 (six) hours as needed for mild pain or headaches, Starting Fri 9/13/2019, No Print      calcium carbonate (TUMS) 500 mg chewable tablet Chew 1 tablet daily as needed for indigestion or heartburn, Historical Med      cetirizine (ZyrTEC) 10 mg tablet Take 10 mg by mouth daily, Historical Med      ibuprofen (MOTRIN) 200 mg tablet Take 3 tablets (600 mg total) by mouth every 6 (six) hours as needed for moderate pain, Starting Fri 9/13/2019, No Print      medroxyPROGESTERone (DEPO-PROVERA) 150 mg/mL injection Inject 1 mL (150 mg total) into a muscle every 3 (three) months, Starting Thu 7/18/2019, Normal             No discharge procedures on file      PDMP Review     None          ED Provider  Electronically Signed by           Divina Mejia PA-C  04/28/23 7293

## 2023-08-16 ENCOUNTER — APPOINTMENT (EMERGENCY)
Dept: CT IMAGING | Facility: HOSPITAL | Age: 43
End: 2023-08-16
Payer: COMMERCIAL

## 2023-08-16 ENCOUNTER — HOSPITAL ENCOUNTER (EMERGENCY)
Facility: HOSPITAL | Age: 43
Discharge: HOME | End: 2023-08-16
Attending: EMERGENCY MEDICINE | Admitting: EMERGENCY MEDICINE
Payer: COMMERCIAL

## 2023-08-16 VITALS
HEIGHT: 65 IN | DIASTOLIC BLOOD PRESSURE: 81 MMHG | OXYGEN SATURATION: 99 % | TEMPERATURE: 98.3 F | HEART RATE: 88 BPM | WEIGHT: 203.93 LBS | BODY MASS INDEX: 33.98 KG/M2 | SYSTOLIC BLOOD PRESSURE: 121 MMHG | RESPIRATION RATE: 16 BRPM

## 2023-08-16 DIAGNOSIS — K62.5 BRBPR (BRIGHT RED BLOOD PER RECTUM): Primary | ICD-10-CM

## 2023-08-16 LAB
ABO GROUP BLD: NORMAL
ALBUMIN SERPL BCP-MCNC: 4.3 G/DL (ref 3.5–5)
ALP SERPL-CCNC: 51 U/L (ref 34–104)
ALT SERPL W P-5'-P-CCNC: 10 U/L (ref 7–52)
ANION GAP SERPL CALCULATED.3IONS-SCNC: 6 MMOL/L
APTT PPP: 31 SECONDS (ref 23–37)
AST SERPL W P-5'-P-CCNC: 11 U/L (ref 13–39)
BASOPHILS # BLD AUTO: 0.15 THOUSANDS/ÂΜL (ref 0–0.1)
BASOPHILS NFR BLD AUTO: 1 % (ref 0–1)
BILIRUB SERPL-MCNC: 0.21 MG/DL (ref 0.2–1)
BILIRUB UR QL STRIP: NEGATIVE
BLD GP AB SCN SERPL QL: NEGATIVE
BUN SERPL-MCNC: 5 MG/DL (ref 5–25)
CALCIUM SERPL-MCNC: 9.1 MG/DL (ref 8.4–10.2)
CHLORIDE SERPL-SCNC: 104 MMOL/L (ref 96–108)
CLARITY UR: CLEAR
CO2 SERPL-SCNC: 27 MMOL/L (ref 21–32)
COLOR UR: ABNORMAL
CREAT SERPL-MCNC: 0.62 MG/DL (ref 0.6–1.3)
EOSINOPHIL # BLD AUTO: 0.89 THOUSAND/ÂΜL (ref 0–0.61)
EOSINOPHIL NFR BLD AUTO: 7 % (ref 0–6)
ERYTHROCYTE [DISTWIDTH] IN BLOOD BY AUTOMATED COUNT: 12.5 % (ref 11.6–15.1)
EXT PREGNANCY TEST URINE: NEGATIVE
EXT. CONTROL: NORMAL
GFR SERPL CREATININE-BSD FRML MDRD: 110 ML/MIN/1.73SQ M
GLUCOSE SERPL-MCNC: 94 MG/DL (ref 65–140)
GLUCOSE UR STRIP-MCNC: NEGATIVE MG/DL
HCT VFR BLD AUTO: 42.8 % (ref 34.8–46.1)
HGB BLD-MCNC: 13.9 G/DL (ref 11.5–15.4)
HGB UR QL STRIP.AUTO: NEGATIVE
IMM GRANULOCYTES # BLD AUTO: 0.05 THOUSAND/UL (ref 0–0.2)
IMM GRANULOCYTES NFR BLD AUTO: 0 % (ref 0–2)
INR PPP: 0.95 (ref 0.84–1.19)
KETONES UR STRIP-MCNC: NEGATIVE MG/DL
LEUKOCYTE ESTERASE UR QL STRIP: NEGATIVE
LIPASE SERPL-CCNC: 22 U/L (ref 11–82)
LYMPHOCYTES # BLD AUTO: 3.43 THOUSANDS/ÂΜL (ref 0.6–4.47)
LYMPHOCYTES NFR BLD AUTO: 26 % (ref 14–44)
MCH RBC QN AUTO: 27.6 PG (ref 26.8–34.3)
MCHC RBC AUTO-ENTMCNC: 32.5 G/DL (ref 31.4–37.4)
MCV RBC AUTO: 85 FL (ref 82–98)
MONOCYTES # BLD AUTO: 0.82 THOUSAND/ÂΜL (ref 0.17–1.22)
MONOCYTES NFR BLD AUTO: 6 % (ref 4–12)
NEUTROPHILS # BLD AUTO: 7.66 THOUSANDS/ÂΜL (ref 1.85–7.62)
NEUTS SEG NFR BLD AUTO: 60 % (ref 43–75)
NITRITE UR QL STRIP: NEGATIVE
NRBC BLD AUTO-RTO: 0 /100 WBCS
PH UR STRIP.AUTO: 7 [PH]
PLATELET # BLD AUTO: 266 THOUSANDS/UL (ref 149–390)
PMV BLD AUTO: 11 FL (ref 8.9–12.7)
POTASSIUM SERPL-SCNC: 3.8 MMOL/L (ref 3.5–5.3)
PROT SERPL-MCNC: 7 G/DL (ref 6.4–8.4)
PROT UR STRIP-MCNC: NEGATIVE MG/DL
PROTHROMBIN TIME: 13.3 SECONDS (ref 11.6–14.5)
RBC # BLD AUTO: 5.03 MILLION/UL (ref 3.81–5.12)
RH BLD: POSITIVE
SODIUM SERPL-SCNC: 137 MMOL/L (ref 135–147)
SP GR UR STRIP.AUTO: <1.005 (ref 1–1.03)
SPECIMEN EXPIRATION DATE: NORMAL
UROBILINOGEN UR STRIP-ACNC: <2 MG/DL
WBC # BLD AUTO: 13 THOUSAND/UL (ref 4.31–10.16)

## 2023-08-16 PROCEDURE — 74176 CT ABD & PELVIS W/O CONTRAST: CPT

## 2023-08-16 PROCEDURE — 81025 URINE PREGNANCY TEST: CPT

## 2023-08-16 PROCEDURE — G1004 CDSM NDSC: HCPCS

## 2023-08-16 PROCEDURE — 86900 BLOOD TYPING SEROLOGIC ABO: CPT

## 2023-08-16 PROCEDURE — 85025 COMPLETE CBC W/AUTO DIFF WBC: CPT

## 2023-08-16 PROCEDURE — 86850 RBC ANTIBODY SCREEN: CPT

## 2023-08-16 PROCEDURE — 81003 URINALYSIS AUTO W/O SCOPE: CPT

## 2023-08-16 PROCEDURE — 86901 BLOOD TYPING SEROLOGIC RH(D): CPT

## 2023-08-16 PROCEDURE — 36415 COLL VENOUS BLD VENIPUNCTURE: CPT

## 2023-08-16 PROCEDURE — 83690 ASSAY OF LIPASE: CPT

## 2023-08-16 PROCEDURE — 99284 EMERGENCY DEPT VISIT MOD MDM: CPT

## 2023-08-16 PROCEDURE — 80053 COMPREHEN METABOLIC PANEL: CPT

## 2023-08-16 PROCEDURE — 85610 PROTHROMBIN TIME: CPT

## 2023-08-16 PROCEDURE — 85730 THROMBOPLASTIN TIME PARTIAL: CPT

## 2023-08-16 PROCEDURE — 96374 THER/PROPH/DIAG INJ IV PUSH: CPT

## 2023-08-16 PROCEDURE — 99285 EMERGENCY DEPT VISIT HI MDM: CPT

## 2023-08-16 RX ORDER — LORAZEPAM 2 MG/ML
1 INJECTION INTRAMUSCULAR ONCE
Status: COMPLETED | OUTPATIENT
Start: 2023-08-16 | End: 2023-08-16

## 2023-08-16 RX ORDER — FAMOTIDINE 20 MG/1
20 TABLET, FILM COATED ORAL 2 TIMES DAILY
Qty: 14 TABLET | Refills: 0 | Status: SHIPPED | OUTPATIENT
Start: 2023-08-16 | End: 2023-08-23

## 2023-08-16 RX ORDER — LORAZEPAM 2 MG/ML
0.5 INJECTION INTRAMUSCULAR ONCE
Status: DISCONTINUED | OUTPATIENT
Start: 2023-08-16 | End: 2023-08-16

## 2023-08-16 RX ADMIN — LORAZEPAM 1 MG: 2 INJECTION INTRAMUSCULAR; INTRAVENOUS at 18:33

## 2023-08-16 NOTE — ED PROVIDER NOTES
History  Chief Complaint   Patient presents with   • Black or Bloody Stool     Patient presents to ED with bright red blood in stool after having BM 20 minutes ago. Patient reports bright red in bowl and on tissue paper     This is a 36 y/o female with PMH anxiety who presents to the ER for bloody stools. Patient states approximately 1 hour before she presented to the ER she had an episode of bright red stool. She says she was prepared to have a bowel movement and felt a large "rush" and when she looked in the toilet bowl it was filled with blood. When she wiped there was bright red blood as well. She also admits to an abdominal pressure sensation that she would say is a 5/10, doesn't necessarily say its painful but says "I've had 7 kids so I have a high pain tolerance." she admits that after her bloody bowel movement, she felt lightheaded and also has a headache. She admits to a history of hemorrhoids, denies ever having a colonoscopy in the past. She denies any rectal pain, nausea, vomiting, fevers, chills, chest pain, shortness of breath, syncope. LMP one week ago. Denies ever having this before. She states she was on clindamycin last week for a mouth infection. Denies any past abdominal surgeries. States she had a PE study in February and developed hives 30 minutes after she was given the contrast.       History provided by:  Patient   used: No        Prior to Admission Medications   Prescriptions Last Dose Informant Patient Reported?  Taking?   acetaminophen (TYLENOL) 325 mg tablet   No No   Sig: Take 2 tablets (650 mg total) by mouth every 6 (six) hours as needed for mild pain or headaches   calcium carbonate (TUMS) 500 mg chewable tablet   Yes No   Sig: Chew 1 tablet daily as needed for indigestion or heartburn   cetirizine (ZyrTEC) 10 mg tablet   Yes No   Sig: Take 10 mg by mouth daily   ibuprofen (MOTRIN) 200 mg tablet   No No   Sig: Take 3 tablets (600 mg total) by mouth every 6 (six) hours as needed for moderate pain   medroxyPROGESTERone (DEPO-PROVERA) 150 mg/mL injection   No No   Sig: Inject 1 mL (150 mg total) into a muscle every 3 (three) months      Facility-Administered Medications: None       Past Medical History:   Diagnosis Date   • Abnormal Pap smear of cervix     HGSIL 2016   • Anesthesia     "woke up with 4 procedures/lithotripsy(coded)/bronchoscopy/ cyst removal and wisdom teeth " "also high anxiety"   • Anxiety     panic attack on occas/gets worked up   • Asthma     "controlled'   • Cut of upper extremity     [email or web address]/pt to notify Dr Jayleen Wallace office today   • Cyst in neck at birth    • Dental crowns present    • DVT (deep vein thrombosis) in pregnancy     1999 in right arm and 2008 left leg   • Environmental and seasonal allergies    • Hemorrhoids during pregnancy    • History of kidney stones    • History of palpitations     "with anxiety"   • HPV (human papilloma virus) infection    • Hx of blood clots     during 2 pregnancies 1999/ and 2008/no longer on lovenox   • Intrahepatic cholestasis of pregnancy 5/22/2019   • Low back pain     occas   • Lumbar herniated disc    • Shortness of breath     sometimes   • Thyroid cyst    • Varicella     as child   • Wears glasses    • Wears partial dentures     upper       Past Surgical History:   Procedure Laterality Date   • BRONCHOSCOPY     • CERVICAL BIOPSY  W/ LOOP ELECTRODE EXCISION  2016   • CYST REMOVAL     • LITHOTRIPSY      pf both kidneys, stents placed   • OH LAPAROSCOPY W/RMVL ADNEXAL STRUCTURES Bilateral 9/13/2019    Procedure: SALPINGECTOMY, LAPAROSCOPIC;  Surgeon: Sandra Hill MD;  Location: [facility];  Service: Gynecology   • TONSILECTOMY AND ADNOIDECTOMY     • WISDOM TOOTH EXTRACTION         Family History   Problem Relation Age of Onset   • Leukemia Mother    • No Known Problems Father    • No Known Problems Sister      I have reviewed and agree with the history as documented. E-Cigarette/Vaping     E-Cigarette/Vaping Substances   • Nicotine No    • THC No    • CBD No    • Flavoring No    • Other No    • Unknown No      Social History     Tobacco Use   • Smoking status: Every Day     Types: Cigarettes   • Smokeless tobacco: Never   Substance Use Topics   • Alcohol use: Yes   • Drug use: No       Review of Systems   Constitutional: Negative for chills and fever. Respiratory: Negative for shortness of breath. Cardiovascular: Negative for chest pain. Gastrointestinal: Positive for abdominal pain and blood in stool. Negative for nausea and vomiting. Skin: Negative for color change. Neurological: Positive for light-headedness and headaches. Psychiatric/Behavioral: Negative for behavioral problems and sleep disturbance. All other systems reviewed and are negative. Physical Exam  Physical Exam  Vitals and nursing note reviewed. Exam conducted with a chaperone present. Constitutional:       General: She is awake. Appearance: Normal appearance. She is well-developed. HENT:      Head: Normocephalic and atraumatic. Right Ear: External ear normal.      Left Ear: External ear normal.      Nose: Nose normal.   Eyes:      General: No scleral icterus. Extraocular Movements: Extraocular movements intact. Cardiovascular:      Rate and Rhythm: Normal rate and regular rhythm. Heart sounds: Normal heart sounds, S1 normal and S2 normal. No murmur heard. No gallop. Pulmonary:      Effort: Pulmonary effort is normal.      Breath sounds: Normal breath sounds. No wheezing, rhonchi or rales. Abdominal:      General: Abdomen is protuberant. Bowel sounds are normal.      Palpations: Abdomen is soft. Tenderness: There is abdominal tenderness in the right lower quadrant and periumbilical area. There is no guarding or rebound. Musculoskeletal:         General: Normal range of motion. Cervical back: Normal range of motion.    Skin: General: Skin is warm and dry. Neurological:      General: No focal deficit present. Mental Status: She is alert. Psychiatric:         Attention and Perception: Attention and perception normal.         Mood and Affect: Mood normal.         Behavior: Behavior normal. Behavior is cooperative.          Vital Signs  ED Triage Vitals [08/16/23 1745]   Temperature Pulse Respirations Blood Pressure SpO2   98.3 °F (36.8 °C) 88 16 121/81 99 %      Temp src Heart Rate Source Patient Position - Orthostatic VS BP Location FiO2 (%)   -- -- -- -- --      Pain Score       --           Vitals:    08/16/23 1745   BP: 121/81   Pulse: 88         Visual Acuity      ED Medications  Medications   LORazepam (ATIVAN) injection 1 mg (1 mg Intravenous Given 8/16/23 1833)       Diagnostic Studies  Results Reviewed     Procedure Component Value Units Date/Time    Protime-INR [[id number]]  (Normal) Collected: 08/16/23 1823    Lab Status: Final result Specimen: Blood from Arm, Right Updated: 08/16/23 1853     Protime 13.3 seconds      INR 0.95    APTT [[id number]]  (Normal) Collected: 08/16/23 1823    Lab Status: Final result Specimen: Blood from Arm, Right Updated: 08/16/23 1853     PTT 31 seconds     Comprehensive metabolic panel [[id number]]  (Abnormal) Collected: 08/16/23 1823    Lab Status: Final result Specimen: Blood from Arm, Right Updated: 08/16/23 1849     Sodium 137 mmol/L      Potassium 3.8 mmol/L      Chloride 104 mmol/L      CO2 27 mmol/L      ANION GAP 6 mmol/L      BUN 5 mg/dL      Creatinine 0.62 mg/dL      Glucose 94 mg/dL      Calcium 9.1 mg/dL      AST 11 U/L      ALT 10 U/L      Alkaline Phosphatase 51 U/L      Total Protein 7.0 g/dL      Albumin 4.3 g/dL      Total Bilirubin 0.21 mg/dL      eGFR 110 ml/min/1.73sq m     Narrative:      [place] guidelines for Chronic Kidney Disease (CKD):   •  Stage 1 with normal or high GFR (GFR > 90 mL/min/1.73 square meters)  •  Stage 2 Mild CKD (GFR = 60-89 mL/min/1.73 square meters)  •  Stage 3A Moderate CKD (GFR = 45-59 mL/min/1.73 square meters)  •  Stage 3B Moderate CKD (GFR = 30-44 mL/min/1.73 square meters)  •  Stage 4 Severe CKD (GFR = 15-29 mL/min/1.73 square meters)  •  Stage 5 End Stage CKD (GFR <15 mL/min/1.73 square meters)  Note: GFR calculation is accurate only with a steady state creatinine    Lipase [[id number]]  (Normal) Collected: 08/16/23 1823    Lab Status: Final result Specimen: Blood from Arm, Right Updated: 08/16/23 1849     Lipase 22 u/L     UA w Reflex to Microscopic w Reflex to Culture [[id number]]  (Abnormal) Collected: 08/16/23 1824    Lab Status: Final result Specimen: Urine, Clean Catch Updated: 08/16/23 1837     Color, UA Light Yellow     Clarity, UA Clear     Specific Gravity, UA <1.005     pH, UA 7.0     Leukocytes, UA Negative     Nitrite, UA Negative     Protein, UA Negative mg/dl      Glucose, UA Negative mg/dl      Ketones, UA Negative mg/dl      Urobilinogen, UA <2.0 mg/dl      Bilirubin, UA Negative     Occult Blood, UA Negative    CBC and differential [[id number]]  (Abnormal) Collected: 08/16/23 1823    Lab Status: Final result Specimen: Blood from Arm, Right Updated: 08/16/23 1832     WBC 13.00 Thousand/uL      RBC 5.03 Million/uL      Hemoglobin 13.9 g/dL      Hematocrit 42.8 %      MCV 85 fL      MCH 27.6 pg      MCHC 32.5 g/dL      RDW 12.5 %      MPV 11.0 fL      Platelets 301 Thousands/uL      nRBC 0 /100 WBCs      Neutrophils Relative 60 %      Immat GRANS % 0 %      Lymphocytes Relative 26 %      Monocytes Relative 6 %      Eosinophils Relative 7 %      Basophils Relative 1 %      Neutrophils Absolute 7.66 Thousands/µL      Immature Grans Absolute 0.05 Thousand/uL      Lymphocytes Absolute 3.43 Thousands/µL      Monocytes Absolute 0.82 Thousand/µL      Eosinophils Absolute 0.89 Thousand/µL      Basophils Absolute 0.15 Thousands/µL     POCT pregnancy, urine [[id number]]  (Normal) Resulted: 08/16/23 1826    Lab Status: Final result Updated: 08/16/23 1826     EXT Preg Test, Ur Negative     Control Valid                 CT abdomen pelvis wo contrast   Final Result by Angela Harris MD (08/16 2015)      No acute abdominopelvic pathology. Workstation performed: [id number]                    Procedures  Procedures         ED Course  ED Course as of 08/16/23 2118   Wed Aug 16, 2023   2021 WBC(!): 13.00  12.9 on 7/14/23                               SBIRT 20yo+    Flowsheet Row Most Recent Value   Initial Alcohol Screen: US AUDIT-C     1. How often do you have a drink containing alcohol? 0 Filed at: 08/16/2023 1747   2. How many drinks containing alcohol do you have on a typical day you are drinking? 0 Filed at: 08/16/2023 1747   3b. FEMALE Any Age, or MALE 65+: How often do you have 4 or more drinks on one occassion? 0 Filed at: 08/16/2023 1747   Audit-C Score 0 Filed at: 08/16/2023 1747   CLEMENTE: How many times in the past year have you. .. Used an illegal drug or used a prescription medication for non-medical reasons? Never Filed at: 08/16/2023 1747                    Medical Decision Making  36 y/o female here for episode of bloody stools   Differential diagnosis including but not limited to: hemorrhoids, diverticular bleed, bowel perforation, anemia, electrolyte abnormality, infectious diarrhea, IBS, IBD  Assessment: bright red blood per rectum  Plan: labs unchanged from one month ago. Patient had a lot of anxiety regarding CT so gave her 1 of ativan prior to scan. CT unremarkable. No further episodes of bloody stool while in ED. Started on BID PPI and sent referral to gastroenterology and advised patient to schedule an appointment. She was given strict return to ER precautions both verbally and in discharge papers. Patient verbalized understanding and agrees with plan. Amount and/or Complexity of Data Reviewed  Labs: ordered. Decision-making details documented in ED Course. Radiology: ordered.       Risk  Prescription drug management. Disposition  Final diagnoses:   BRBPR (bright red blood per rectum)     Time reflects when diagnosis was documented in both MDM as applicable and the Disposition within this note     Time User Action Codes Description Comment    8/16/2023  8:26 PM Aaron Workman Add [K62.5] Bright red blood per rectum     8/16/2023  8:26 PM Aaron Workman Remove [K62.5] Bright red blood per rectum     8/16/2023  8:26 PM Aaron Workman Add [K62.5] BRBPR (bright red blood per rectum)       ED Disposition     ED Disposition   Discharge    Condition   Stable    Date/Time   Wed Aug 16, 2023  8:26 PM    [address] [place] [address] discharge to home/self care.                Follow-up Information     Follow up With Specialties Details Why Contact Info Additional [address] [facility] Schedule an appointment as soon as possible for a visit   [address] [place] [id number] [phone] [address] [facility], [facility] [address], [address] [facility]     [phone]     [address] [place] [address] Emergency Department Emergency Medicine Go to  if you begin to experience intense abdominal pain that localizes to one spot in your abdomen, abdomen becomes hard or rigid, cant stop vomiting, blood in vomit urine or stool, chest pain, shortness of breath, difficulty breathing, fevers > 104, feeling dizzy or weak like you are going to faint [address] [facility] [address]  [phone] [facility] Emergency Department, [address] [facility], [address] [facility]          Discharge Medication List as of 8/16/2023  8:29 PM      START taking these medications    Details   famotidine (PEPCID) 20 mg tablet Take 1 tablet (20 mg total) by mouth 2 (two) times a day for 7 days, Starting Wed 8/16/2023, Until Wed 8/23/2023, Normal         CONTINUE these medications which have NOT CHANGED    Details   acetaminophen (TYLENOL) 325 mg tablet Take 2 tablets (650 mg total) by mouth every 6 (six) hours as needed for mild pain or headaches, Starting Fri 9/13/2019, No Print      calcium carbonate (TUMS) 500 mg chewable tablet Chew 1 tablet daily as needed for indigestion or heartburn, Historical Med      cetirizine (ZyrTEC) 10 mg tablet Take 10 mg by mouth daily, Historical Med      ibuprofen (MOTRIN) 200 mg tablet Take 3 tablets (600 mg total) by mouth every 6 (six) hours as needed for moderate pain, Starting Fri 9/13/2019, No Print      medroxyPROGESTERone (DEPO-PROVERA) 150 mg/mL injection Inject 1 mL (150 mg total) into a muscle every 3 (three) months, Starting u 7/18/2019, Normal                 PDMP Review     None          ED Provider  Electronically Signed by           Therese Irene PA-C  08/16/23 9231

## 2023-08-16 NOTE — ED NOTES
Per CT scan, it is OK for pt spouse to go to CT scan and stand with the patient for comfort. Pt has high anxiety in regards to getting a CT scan. Pt was given Ativan prior to procedure.      Mily Coates  08/16/23 9186

## 2023-08-17 NOTE — DISCHARGE INSTRUCTIONS
Take pepcid 20 mg twice a day   Avoid spicy or acidic foods, ibuprofen/advil/motrin, drinking, smoking as these will make your symptoms worse  Stay hydrated with electrolyte drinks such as gatorade and pedialyte. Rest as much as possible.    Schedule an appointment with GI for follow up  Return to ED if you begin to experience intense abdominal pain that localizes to one spot in your abdomen, abdomen becomes hard or rigid, cant stop vomiting, blood in vomit urine or stool, chest pain, shortness of breath, difficulty breathing, fevers > 104, feeling dizzy or weak like you are going to faint

## 2023-10-05 ENCOUNTER — HOSPITAL ENCOUNTER (EMERGENCY)
Facility: HOSPITAL | Age: 43
Discharge: HOME | End: 2023-10-06
Attending: EMERGENCY MEDICINE
Payer: COMMERCIAL

## 2023-10-05 VITALS
OXYGEN SATURATION: 98 % | DIASTOLIC BLOOD PRESSURE: 66 MMHG | HEART RATE: 94 BPM | SYSTOLIC BLOOD PRESSURE: 108 MMHG | TEMPERATURE: 98.1 F | RESPIRATION RATE: 18 BRPM

## 2023-10-05 DIAGNOSIS — R09.A2 GLOBUS SENSATION: ICD-10-CM

## 2023-10-05 DIAGNOSIS — R68.84 JAW PAIN: Primary | ICD-10-CM

## 2023-10-05 PROCEDURE — 99283 EMERGENCY DEPT VISIT LOW MDM: CPT

## 2023-10-05 PROCEDURE — 99284 EMERGENCY DEPT VISIT MOD MDM: CPT | Performed by: EMERGENCY MEDICINE

## 2023-10-05 RX ORDER — BACLOFEN 10 MG/1
10 TABLET ORAL ONCE
Status: COMPLETED | OUTPATIENT
Start: 2023-10-05 | End: 2023-10-05

## 2023-10-05 RX ADMIN — BACLOFEN 10 MG: 10 TABLET ORAL at 23:35

## 2023-10-06 RX ORDER — BACLOFEN 10 MG/1
10 TABLET ORAL EVERY 8 HOURS PRN
Qty: 15 TABLET | Refills: 0 | Status: SHIPPED | OUTPATIENT
Start: 2023-10-05

## 2023-10-06 NOTE — DISCHARGE INSTRUCTIONS
your prescription from [facility] tomorrow. Use only as necessary for muscle spasm and difficulty opening your mouth. Crush 1 tablet in applesauce or pudding every 8 hours as needed. Also consider liquid Tylenol or ibuprofen as needed. You should get a call tomorrow to schedule otolaryngology appointment. If you do not hear from them by Friday morning call central scheduling office. The name of one of the ENT doctors is below. Call if you do not hear from central scheduling by Friday.

## 2023-10-06 NOTE — ED PROVIDER NOTES
History  Chief Complaint   Patient presents with   • Jaw Pain     Patient reports jaw pain, SOB, and anxiety that started a few days ago, but has gotten worse today. 60-year-old female with history of branchial cleft cysts, asthma, anxiety and provoked DVTs has had globus sensation with difficulty swallowing since March. She has had CT scans of soft tissue neck and facial bones. She had soft tissue neck x-rays. She had endoscopy and bronchoscopy for these symptoms. Testing showed no acute findings. She has had remote tonsillectomy and adenoidectomy. She went to the dentist approximately 3 weeks ago who removed an infected right mandibular molar. About 4 or 5 days after that surgery she started feeling discomfort on the right side of the jaw and has had difficulty opening her jaw all the way. This has gotten worse lately. She denies recent fever, dyspnea, neck pain, new facial swelling. She just finished clindamycin s/p tooth extraction. States she has lost 60 pounds since March due to decreased intake. She admits to being very anxious about her health. Prior to Admission Medications   Prescriptions Last Dose Informant Patient Reported?  Taking?   acetaminophen (TYLENOL) 325 mg tablet   No No   Sig: Take 2 tablets (650 mg total) by mouth every 6 (six) hours as needed for mild pain or headaches   calcium carbonate (TUMS) 500 mg chewable tablet   Yes No   Sig: Chew 1 tablet daily as needed for indigestion or heartburn   cetirizine (ZyrTEC) 10 mg tablet   Yes No   Sig: Take 10 mg by mouth daily   famotidine (PEPCID) 20 mg tablet   No No   Sig: Take 1 tablet (20 mg total) by mouth 2 (two) times a day for 7 days   ibuprofen (MOTRIN) 200 mg tablet   No No   Sig: Take 3 tablets (600 mg total) by mouth every 6 (six) hours as needed for moderate pain   medroxyPROGESTERone (DEPO-PROVERA) 150 mg/mL injection   No No   Sig: Inject 1 mL (150 mg total) into a muscle every 3 (three) months Facility-Administered Medications: None       Past Medical History:   Diagnosis Date   • Abnormal Pap smear of cervix     HGSIL 2016   • Anesthesia     "woke up with 4 procedures/lithotripsy(coded)/bronchoscopy/ cyst removal and wisdom teeth " "also high anxiety"   • Anxiety     panic attack on occas/gets worked up   • Asthma     "controlled'   • Cut of upper extremity     arms and legs from landscaping yest 9/11/pt to notify Dr Samia Bell office today   • Cyst in neck at birth    • Dental crowns present    • DVT (deep vein thrombosis) in pregnancy     1999 in right arm and 2008 left leg   • Environmental and seasonal allergies    • Hemorrhoids during pregnancy    • History of kidney stones    • History of palpitations     "with anxiety"   • HPV (human papilloma virus) infection    • Hx of blood clots     during 2 pregnancies 1999/ and 2008/no longer on lovenox   • Intrahepatic cholestasis of pregnancy 5/22/2019   • Low back pain     occas   • Lumbar herniated disc    • Shortness of breath     sometimes   • Thyroid cyst    • Varicella     as child   • Wears glasses    • Wears partial dentures     upper       Past Surgical History:   Procedure Laterality Date   • BRONCHOSCOPY     • CERVICAL BIOPSY  W/ LOOP ELECTRODE EXCISION  2016   • CYST REMOVAL     • LITHOTRIPSY      pf both kidneys, stents placed   • [place] LAPAROSCOPY W/RMVL ADNEXAL STRUCTURES Bilateral 9/13/2019    Procedure: SALPINGECTOMY, LAPAROSCOPIC;  Surgeon: Deirdre Flores MD;  Location: [facility];  Service: Gynecology   • TONSILECTOMY AND ADNOIDECTOMY     • WISDOM TOOTH EXTRACTION         Family History   Problem Relation Age of Onset   • Leukemia Mother    • No Known Problems Father    • No Known Problems Sister      I have reviewed and agree with the history as documented.     E-Cigarette/Vaping     E-Cigarette/Vaping Substances   • Nicotine No    • THC No    • CBD No    • Flavoring No    • Other No    • Unknown No      Social History     Tobacco Use   • Smoking status: Every Day     Types: Cigarettes   • Smokeless tobacco: Never   Substance Use Topics   • Alcohol use: Yes   • Drug use: No       Review of Systems   Constitutional:  Negative for fever. Respiratory:  Negative for cough and shortness of breath. Cardiovascular:  Negative for chest pain and palpitations. Gastrointestinal:  Negative for abdominal pain, diarrhea and vomiting. Physical Exam  Physical Exam  Vitals and nursing note reviewed. Constitutional:       General: She is not in acute distress. Appearance: She is well-developed. She is not ill-appearing or diaphoretic. HENT:      Head: Normocephalic and atraumatic. Right Ear: Tympanic membrane, ear canal and external ear normal.      Left Ear: Tympanic membrane, ear canal and external ear normal.      Nose: Nose normal.      Mouth/Throat:      Mouth: Mucous membranes are moist.      Pharynx: Oropharynx is clear. No oropharyngeal exudate or posterior oropharyngeal erythema. Comments: Palpation of mandibular condyles in both auditory canals feels normal.  Eyes:      General: No scleral icterus. Conjunctiva/sclera: Conjunctivae normal.      Pupils: Pupils are equal, round, and reactive to light. Neck:      Comments: Firm 1 cm diameter mass below right parotid gland (patient states this is chronic). Cardiovascular:      Rate and Rhythm: Normal rate and regular rhythm. Pulses: Normal pulses. Heart sounds: Normal heart sounds. No murmur heard. Pulmonary:      Effort: Pulmonary effort is normal.      Breath sounds: Normal breath sounds. Abdominal:      General: Bowel sounds are normal.      Palpations: Abdomen is soft. Tenderness: There is no abdominal tenderness. Musculoskeletal:         General: Normal range of motion. Cervical back: Normal range of motion and neck supple. Skin:     General: Skin is warm and dry. Capillary Refill: Capillary refill takes less than 2 seconds. Findings: No rash. Neurological:      General: No focal deficit present. Mental Status: She is alert and oriented to person, place, and time. Mental status is at baseline. Cranial Nerves: No cranial nerve deficit. Coordination: Coordination normal.      Deep Tendon Reflexes: Reflexes are normal and symmetric. Psychiatric:         Behavior: Behavior normal.      Comments: Anxious, tearful at times. Vital Signs  ED Triage Vitals [10/05/23 2140]   Temperature Pulse Respirations Blood Pressure SpO2   98.1 °F (36.7 °C) 94 18 108/66 98 %      Temp Source Heart Rate Source Patient Position - Orthostatic VS BP Location FiO2 (%)   Temporal Monitor Sitting Left arm --      Pain Score       --           Vitals:    10/05/23 2140   BP: 108/66   Pulse: 94   Patient Position - Orthostatic VS: Sitting         Visual Acuity      ED Medications  Medications   baclofen tablet 10 mg (10 mg Oral Given 10/5/23 0447)       Diagnostic Studies  Results Reviewed     None                 No orders to display              Procedures  Procedures         ED Course                               SBIRT 20yo+    Flowsheet Row Most Recent Value   Initial Alcohol Screen: US AUDIT-C     1. How often do you have a drink containing alcohol? 0 Filed at: 10/05/2023 2140   2. How many drinks containing alcohol do you have on a typical day you are drinking? 0 Filed at: 10/05/2023 2140   3a. Male UNDER 65: How often do you have five or more drinks on one occasion? 0 Filed at: 10/05/2023 2140   3b. FEMALE Any Age, or MALE 65+: How often do you have 4 or more drinks on one occassion? 0 Filed at: 10/05/2023 2140   Audit-C Score 0 Filed at: 10/05/2023 2140   CLEMENTE: How many times in the past year have you. .. Used an illegal drug or used a prescription medication for non-medical reasons?  Never Filed at: 10/05/2023 2140                    Medical Decision Making  40-year-old female with history of right bronchial cleft cyst, anxiety complains of continued and worsening discomforts in the right mandibular region. This cleared several days after tooth extraction. She has multiple physician evaluations including multiple imaging studies done as reviewed in the records. No acute findings have been noted since March. On exam patient has normal phonation and has no drooling, difficulty swallowing or handling secretions. There is no intraoral swelling. Firm structure below the right angle of mandible and parotid is palpated. Patient states this is normal for her. There is no sign of infection, thrombosis, trauma. Will trial skeletal muscle relaxants. Amatory referral to ENT was written for. Globus sensation: chronic illness or injury  Jaw pain: chronic illness or injury with exacerbation, progression, or side effects of treatment  Amount and/or Complexity of Data Reviewed  External Data Reviewed: radiology and notes. Risk  Prescription drug management. Disposition  Final diagnoses:   Jaw pain   Globus sensation     Time reflects when diagnosis was documented in both MDM as applicable and the Disposition within this note     Time User Action Codes Description Comment    10/5/2023 11:52 PM Priscille Hawking Add [O35.07] Jaw pain     10/5/2023 11:52 PM Priscille Hawking Add [R09. A2] Globus sensation       ED Disposition     ED Disposition   Discharge    Condition   Stable    Date/Time   Fri Oct 6, 2023 12:03 AM    Comment   Brennon Clink discharge to home/self care. Follow-up Information     Follow up With Specialties Details Why Sabina Rand MD Otolaryngology Schedule an appointment as soon as possible for a visit  If you are not called to schedule appointment by Friday. [facility].   [address] [place] [address]  [phone]            Discharge Medication List as of 10/6/2023 12:03 AM      START taking these medications    Details   baclofen 10 mg tablet Take 1 tablet (10 mg total) by mouth every 8 (eight) hours as needed for muscle spasms (jaw spasm) Crush tablet into apple sauce or pudding., Starting Thu 10/5/2023, Normal         CONTINUE these medications which have NOT CHANGED    Details   acetaminophen (TYLENOL) 325 mg tablet Take 2 tablets (650 mg total) by mouth every 6 (six) hours as needed for mild pain or headaches, Starting Fri 9/13/2019, No Print      calcium carbonate (TUMS) 500 mg chewable tablet Chew 1 tablet daily as needed for indigestion or heartburn, Historical Med      cetirizine (ZyrTEC) 10 mg tablet Take 10 mg by mouth daily, Historical Med      famotidine (PEPCID) 20 mg tablet Take 1 tablet (20 mg total) by mouth 2 (two) times a day for 7 days, Starting Wed 8/16/2023, Until Wed 8/23/2023, Normal      ibuprofen (MOTRIN) 200 mg tablet Take 3 tablets (600 mg total) by mouth every 6 (six) hours as needed for moderate pain, Starting Fri 9/13/2019, No Print      medroxyPROGESTERone (DEPO-PROVERA) 150 mg/mL injection Inject 1 mL (150 mg total) into a muscle every 3 (three) months, Starting Thu 7/18/2019, Normal                 PDMP Review     None          ED Provider  Electronically Signed by             Solo Ibrahim DO  10/06/23 8881

## 2023-10-22 ENCOUNTER — HOSPITAL ENCOUNTER (EMERGENCY)
Facility: HOSPITAL | Age: 43
Discharge: HOME | End: 2023-10-23
Attending: STUDENT IN AN ORGANIZED HEALTH CARE EDUCATION/TRAINING PROGRAM
Payer: COMMERCIAL

## 2023-10-22 ENCOUNTER — APPOINTMENT (EMERGENCY)
Dept: CT IMAGING | Facility: HOSPITAL | Age: 43
End: 2023-10-22
Payer: COMMERCIAL

## 2023-10-22 ENCOUNTER — APPOINTMENT (EMERGENCY)
Dept: RADIOLOGY | Facility: HOSPITAL | Age: 43
End: 2023-10-22
Payer: COMMERCIAL

## 2023-10-22 DIAGNOSIS — D72.829 LEUKOCYTOSIS: ICD-10-CM

## 2023-10-22 DIAGNOSIS — R07.9 CHEST PAIN WITH LOW RISK OF ACUTE CORONARY SYNDROME: Primary | ICD-10-CM

## 2023-10-22 LAB
ALBUMIN SERPL BCP-MCNC: 4.2 G/DL (ref 3.5–5)
ALP SERPL-CCNC: 44 U/L (ref 34–104)
ALT SERPL W P-5'-P-CCNC: 8 U/L (ref 7–52)
ANION GAP SERPL CALCULATED.3IONS-SCNC: 8 MMOL/L
APTT PPP: 30 SECONDS (ref 23–37)
AST SERPL W P-5'-P-CCNC: 9 U/L (ref 13–39)
BASOPHILS # BLD AUTO: 0.15 THOUSANDS/ÂΜL (ref 0–0.1)
BASOPHILS NFR BLD AUTO: 1 % (ref 0–1)
BILIRUB SERPL-MCNC: 0.19 MG/DL (ref 0.2–1)
BILIRUB UR QL STRIP: NEGATIVE
BNP SERPL-MCNC: 16 PG/ML (ref 0–100)
BUN SERPL-MCNC: 6 MG/DL (ref 5–25)
CALCIUM SERPL-MCNC: 9.6 MG/DL (ref 8.4–10.2)
CARDIAC TROPONIN I PNL SERPL HS: <2 NG/L
CHLORIDE SERPL-SCNC: 102 MMOL/L (ref 96–108)
CLARITY UR: CLEAR
CO2 SERPL-SCNC: 30 MMOL/L (ref 21–32)
COLOR UR: YELLOW
CREAT SERPL-MCNC: 0.64 MG/DL (ref 0.6–1.3)
D DIMER PPP FEU-MCNC: 0.32 UG/ML FEU
EOSINOPHIL # BLD AUTO: 0.83 THOUSAND/ÂΜL (ref 0–0.61)
EOSINOPHIL NFR BLD AUTO: 6 % (ref 0–6)
ERYTHROCYTE [DISTWIDTH] IN BLOOD BY AUTOMATED COUNT: 12.3 % (ref 11.6–15.1)
EXT PREGNANCY TEST URINE: NEGATIVE
EXT. CONTROL: NORMAL
GFR SERPL CREATININE-BSD FRML MDRD: 109 ML/MIN/1.73SQ M
GLUCOSE SERPL-MCNC: 101 MG/DL (ref 65–140)
GLUCOSE UR STRIP-MCNC: NEGATIVE MG/DL
HCT VFR BLD AUTO: 41.2 % (ref 34.8–46.1)
HGB BLD-MCNC: 13.5 G/DL (ref 11.5–15.4)
HGB UR QL STRIP.AUTO: NEGATIVE
IMM GRANULOCYTES # BLD AUTO: 0.05 THOUSAND/UL (ref 0–0.2)
IMM GRANULOCYTES NFR BLD AUTO: 0 % (ref 0–2)
INR PPP: 1.03 (ref 0.84–1.19)
KETONES UR STRIP-MCNC: NEGATIVE MG/DL
LEUKOCYTE ESTERASE UR QL STRIP: NEGATIVE
LIPASE SERPL-CCNC: 22 U/L (ref 11–82)
LYMPHOCYTES # BLD AUTO: 3.59 THOUSANDS/ÂΜL (ref 0.6–4.47)
LYMPHOCYTES NFR BLD AUTO: 25 % (ref 14–44)
MCH RBC QN AUTO: 27.6 PG (ref 26.8–34.3)
MCHC RBC AUTO-ENTMCNC: 32.8 G/DL (ref 31.4–37.4)
MCV RBC AUTO: 84 FL (ref 82–98)
MONOCYTES # BLD AUTO: 0.68 THOUSAND/ÂΜL (ref 0.17–1.22)
MONOCYTES NFR BLD AUTO: 5 % (ref 4–12)
NEUTROPHILS # BLD AUTO: 9.3 THOUSANDS/ÂΜL (ref 1.85–7.62)
NEUTS SEG NFR BLD AUTO: 63 % (ref 43–75)
NITRITE UR QL STRIP: NEGATIVE
NRBC BLD AUTO-RTO: 0 /100 WBCS
PH UR STRIP.AUTO: 7 [PH]
PLATELET # BLD AUTO: 280 THOUSANDS/UL (ref 149–390)
PMV BLD AUTO: 10.4 FL (ref 8.9–12.7)
POTASSIUM SERPL-SCNC: 3.5 MMOL/L (ref 3.5–5.3)
PROT SERPL-MCNC: 7.1 G/DL (ref 6.4–8.4)
PROT UR STRIP-MCNC: NEGATIVE MG/DL
PROTHROMBIN TIME: 14 SECONDS (ref 11.6–14.5)
RBC # BLD AUTO: 4.89 MILLION/UL (ref 3.81–5.12)
SODIUM SERPL-SCNC: 140 MMOL/L (ref 135–147)
SP GR UR STRIP.AUTO: 1.01 (ref 1–1.03)
UROBILINOGEN UR STRIP-ACNC: <2 MG/DL
WBC # BLD AUTO: 14.6 THOUSAND/UL (ref 4.31–10.16)

## 2023-10-22 PROCEDURE — 85730 THROMBOPLASTIN TIME PARTIAL: CPT | Performed by: EMERGENCY MEDICINE

## 2023-10-22 PROCEDURE — 71250 CT THORAX DX C-: CPT

## 2023-10-22 PROCEDURE — 84484 ASSAY OF TROPONIN QUANT: CPT | Performed by: EMERGENCY MEDICINE

## 2023-10-22 PROCEDURE — 83880 ASSAY OF NATRIURETIC PEPTIDE: CPT | Performed by: EMERGENCY MEDICINE

## 2023-10-22 PROCEDURE — 83690 ASSAY OF LIPASE: CPT | Performed by: EMERGENCY MEDICINE

## 2023-10-22 PROCEDURE — 36415 COLL VENOUS BLD VENIPUNCTURE: CPT | Performed by: EMERGENCY MEDICINE

## 2023-10-22 PROCEDURE — 99285 EMERGENCY DEPT VISIT HI MDM: CPT | Performed by: EMERGENCY MEDICINE

## 2023-10-22 PROCEDURE — 96361 HYDRATE IV INFUSION ADD-ON: CPT

## 2023-10-22 PROCEDURE — 81003 URINALYSIS AUTO W/O SCOPE: CPT | Performed by: EMERGENCY MEDICINE

## 2023-10-22 PROCEDURE — 80053 COMPREHEN METABOLIC PANEL: CPT | Performed by: EMERGENCY MEDICINE

## 2023-10-22 PROCEDURE — 99285 EMERGENCY DEPT VISIT HI MDM: CPT

## 2023-10-22 PROCEDURE — 85610 PROTHROMBIN TIME: CPT | Performed by: EMERGENCY MEDICINE

## 2023-10-22 PROCEDURE — 96375 TX/PRO/DX INJ NEW DRUG ADDON: CPT

## 2023-10-22 PROCEDURE — 71045 X-RAY EXAM CHEST 1 VIEW: CPT

## 2023-10-22 PROCEDURE — G1004 CDSM NDSC: HCPCS

## 2023-10-22 PROCEDURE — 81025 URINE PREGNANCY TEST: CPT | Performed by: EMERGENCY MEDICINE

## 2023-10-22 PROCEDURE — 96374 THER/PROPH/DIAG INJ IV PUSH: CPT

## 2023-10-22 PROCEDURE — 74176 CT ABD & PELVIS W/O CONTRAST: CPT

## 2023-10-22 PROCEDURE — 85025 COMPLETE CBC W/AUTO DIFF WBC: CPT | Performed by: EMERGENCY MEDICINE

## 2023-10-22 PROCEDURE — 85379 FIBRIN DEGRADATION QUANT: CPT | Performed by: EMERGENCY MEDICINE

## 2023-10-22 PROCEDURE — 93005 ELECTROCARDIOGRAM TRACING: CPT

## 2023-10-22 RX ORDER — ONDANSETRON 2 MG/ML
4 INJECTION INTRAMUSCULAR; INTRAVENOUS ONCE
Status: COMPLETED | OUTPATIENT
Start: 2023-10-22 | End: 2023-10-22

## 2023-10-22 RX ORDER — LORAZEPAM 2 MG/ML
0.5 INJECTION INTRAMUSCULAR ONCE
Status: COMPLETED | OUTPATIENT
Start: 2023-10-22 | End: 2023-10-22

## 2023-10-22 RX ORDER — KETOROLAC TROMETHAMINE 30 MG/ML
15 INJECTION, SOLUTION INTRAMUSCULAR; INTRAVENOUS ONCE
Status: COMPLETED | OUTPATIENT
Start: 2023-10-22 | End: 2023-10-22

## 2023-10-22 RX ORDER — HYDROXYZINE HYDROCHLORIDE 25 MG/1
25 TABLET, FILM COATED ORAL ONCE
Status: DISCONTINUED | OUTPATIENT
Start: 2023-10-22 | End: 2023-10-22

## 2023-10-22 RX ADMIN — LORAZEPAM 0.5 MG: 2 INJECTION INTRAMUSCULAR; INTRAVENOUS at 23:34

## 2023-10-22 RX ADMIN — SODIUM CHLORIDE 1000 ML: 0.9 INJECTION, SOLUTION INTRAVENOUS at 22:43

## 2023-10-22 RX ADMIN — KETOROLAC TROMETHAMINE 15 MG: 30 INJECTION, SOLUTION INTRAMUSCULAR; INTRAVENOUS at 22:58

## 2023-10-22 RX ADMIN — ONDANSETRON 4 MG: 2 INJECTION INTRAMUSCULAR; INTRAVENOUS at 22:59

## 2023-10-22 NOTE — Clinical Note
Bairon Ventura was seen and treated in our emergency department on 10/22/2023. Diagnosis:     Sathish Ojeda  may return to work on return date. She may return on this date: 10/24/2023         If you have any questions or concerns, please don't hesitate to call.       Carol Medellin MD    ______________________________           _______________          _______________  Hospital Representative                              Date                                Time

## 2023-10-23 VITALS
TEMPERATURE: 97.8 F | WEIGHT: 160 LBS | DIASTOLIC BLOOD PRESSURE: 58 MMHG | OXYGEN SATURATION: 98 % | RESPIRATION RATE: 18 BRPM | BODY MASS INDEX: 26.66 KG/M2 | HEART RATE: 86 BPM | HEIGHT: 65 IN | SYSTOLIC BLOOD PRESSURE: 102 MMHG

## 2023-10-23 LAB
ATRIAL RATE: 90 BPM
P AXIS: 69 DEGREES
PR INTERVAL: 148 MS
QRS AXIS: 62 DEGREES
QRSD INTERVAL: 82 MS
QT INTERVAL: 342 MS
QTC INTERVAL: 418 MS
T WAVE AXIS: 62 DEGREES
VENTRICULAR RATE: 90 BPM

## 2023-10-23 PROCEDURE — 93010 ELECTROCARDIOGRAM REPORT: CPT | Performed by: INTERNAL MEDICINE

## 2023-10-23 NOTE — DISCHARGE INSTRUCTIONS
Follow up with your family doctor for reevaluation, and to also ask if they have recommendations for a nutritionist as discussed. 1% lidocaine

## 2023-10-27 ENCOUNTER — CONSULT (OUTPATIENT)
Dept: MULTI SPECIALTY CLINIC | Facility: CLINIC | Age: 43
End: 2023-10-27

## 2023-10-27 VITALS — WEIGHT: 152 LBS | BODY MASS INDEX: 25.29 KG/M2

## 2023-10-27 DIAGNOSIS — Z87.790 HISTORY OF BRANCHIAL CLEFT CYST: ICD-10-CM

## 2023-10-27 DIAGNOSIS — Z87.898 HISTORY OF CHOKING: ICD-10-CM

## 2023-10-27 DIAGNOSIS — R13.10 DYSPHAGIA, UNSPECIFIED TYPE: Primary | ICD-10-CM

## 2023-10-27 DIAGNOSIS — R63.4 ABNORMAL WEIGHT LOSS: ICD-10-CM
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
## 2023-11-17 ENCOUNTER — CONSULT (OUTPATIENT)
Dept: MULTI SPECIALTY CLINIC | Facility: CLINIC | Age: 43
End: 2023-11-17

## 2023-11-17 VITALS
SYSTOLIC BLOOD PRESSURE: 121 MMHG | BODY MASS INDEX: 24.96 KG/M2 | DIASTOLIC BLOOD PRESSURE: 78 MMHG | WEIGHT: 150 LBS | TEMPERATURE: 98.4 F | HEART RATE: 85 BPM

## 2023-11-17 DIAGNOSIS — R13.10 DYSPHAGIA, UNSPECIFIED TYPE: ICD-10-CM

## 2023-11-17 DIAGNOSIS — R09.A2 GLOBUS SENSATION: ICD-10-CM

## 2023-11-17 DIAGNOSIS — R63.4 UNINTENTIONAL WEIGHT LOSS: ICD-10-CM

## 2023-11-17 DIAGNOSIS — R68.84 JAW PAIN: ICD-10-CM

## 2023-11-17 DIAGNOSIS — J03.90 LINGUAL TONSILLITIS: Primary | ICD-10-CM

## 2023-11-17 RX ORDER — PREDNISONE 10 MG/1
TABLET ORAL
Qty: 30 TABLET | Refills: 0 | Status: SHIPPED | OUTPATIENT
Start: 2023-11-17
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
## 2024-09-08 ENCOUNTER — HOSPITAL ENCOUNTER (EMERGENCY)
Facility: HOSPITAL | Age: 44
Discharge: HOME | End: 2024-09-08

## 2024-09-08 VITALS
DIASTOLIC BLOOD PRESSURE: 73 MMHG | TEMPERATURE: 98.3 F | HEART RATE: 89 BPM | RESPIRATION RATE: 19 BRPM | OXYGEN SATURATION: 98 % | SYSTOLIC BLOOD PRESSURE: 135 MMHG

## 2024-09-08 DIAGNOSIS — J20.9 ACUTE BRONCHITIS: Primary | ICD-10-CM

## 2024-09-08 DIAGNOSIS — J45.909 ASTHMA: ICD-10-CM

## 2024-09-08 PROCEDURE — 99284 EMERGENCY DEPT VISIT MOD MDM: CPT | Performed by: PHYSICIAN ASSISTANT

## 2024-09-08 PROCEDURE — 99283 EMERGENCY DEPT VISIT LOW MDM: CPT

## 2024-09-08 RX ORDER — AZITHROMYCIN 100 MG/5ML
POWDER, FOR SUSPENSION ORAL
Qty: 75 ML | Refills: 0 | Status: SHIPPED | OUTPATIENT
Start: 2024-09-08 | End: 2024-09-13

## 2024-09-08 RX ORDER — ALBUTEROL SULFATE 0.83 MG/ML
2.5 SOLUTION RESPIRATORY (INHALATION) EVERY 6 HOURS PRN
Qty: 75 ML | Refills: 0 | Status: SHIPPED | OUTPATIENT
Start: 2024-09-08

## 2024-09-08 RX ORDER — IPRATROPIUM BROMIDE AND ALBUTEROL SULFATE 2.5; .5 MG/3ML; MG/3ML
3 SOLUTION RESPIRATORY (INHALATION) ONCE
Status: COMPLETED | OUTPATIENT
Start: 2024-09-08 | End: 2024-09-08

## 2024-09-08 RX ORDER — PREDNISOLONE SODIUM PHOSPHATE 15 MG/5ML
45 SOLUTION ORAL DAILY
Qty: 45 ML | Refills: 0 | Status: SHIPPED | OUTPATIENT
Start: 2024-09-08 | End: 2024-09-12

## 2024-09-08 RX ORDER — PREDNISOLONE SODIUM PHOSPHATE 15 MG/5ML
45 SOLUTION ORAL ONCE
Status: COMPLETED | OUTPATIENT
Start: 2024-09-08 | End: 2024-09-08

## 2024-09-08 RX ADMIN — IPRATROPIUM BROMIDE AND ALBUTEROL SULFATE 3 ML: 2.5; .5 SOLUTION RESPIRATORY (INHALATION) at 13:18

## 2024-09-08 RX ADMIN — PREDNISOLONE SODIUM PHOSPHATE 45 MG: 15 SOLUTION ORAL at 13:17

## 2024-09-08 NOTE — ED PROVIDER NOTES
History  Chief Complaint   Patient presents with    Cough     Pt states persistent cough x 3 weeks. States everyone in her house is also sick. Has had bronchitis in the past and thinks it is the same. Albuterol is helping her but states she ran out and needs refill.       History provided by:  Patient  Cough  Cough characteristics:  Dry and non-productive  Severity:  Moderate  Onset quality:  Gradual  Duration:  3 weeks  Timing:  Intermittent  Progression:  Waxing and waning  Chronicity:  New  Smoker: no    Context: sick contacts, upper respiratory infection, weather changes and with activity    Context: not animal exposure, not exposure to allergens, not fumes, not occupational exposure and not smoke exposure    Relieved by: Nebulizer.  Worsened by:  Deep breathing, exposure to cold air, smoking and activity  Ineffective treatments: Ran out of Albuterol for her nebulizer.  Associated symptoms: shortness of breath and wheezing    Associated symptoms: no chest pain, no chills, no diaphoresis, no ear fullness, no ear pain, no eye discharge, no fever, no headaches, no myalgias, no rash, no rhinorrhea, no sinus congestion, no sore throat and no weight loss    Shortness of breath:     Severity:  Moderate    Onset quality:  Gradual    Duration:  3 weeks    Timing:  Intermittent  Wheezing:     Severity:  Moderate    Onset quality:  Gradual    Duration:  3 weeks    Timing:  Intermittent    Progression:  Waxing and waning    Chronicity:  Recurrent  Risk factors: recent infection    Risk factors: no chemical exposure and no recent travel        Prior to Admission Medications   Prescriptions Last Dose Informant Patient Reported? Taking?   acetaminophen (TYLENOL) 325 mg tablet   No No   Sig: Take 2 tablets (650 mg total) by mouth every 6 (six) hours as needed for mild pain or headaches   baclofen 10 mg tablet   No No   Sig: Take 1 tablet (10 mg total) by mouth every 8 (eight) hours as needed for muscle spasms (jaw spasm) Crush  "tablet into apple sauce or pudding.   calcium carbonate (TUMS) 500 mg chewable tablet   Yes No   Sig: Chew 1 tablet daily as needed for indigestion or heartburn   cetirizine (ZyrTEC) 10 mg tablet   Yes No   Sig: Take 10 mg by mouth daily   famotidine (PEPCID) 20 mg tablet   No No   Sig: Take 1 tablet (20 mg total) by mouth 2 (two) times a day for 7 days   ibuprofen (MOTRIN) 200 mg tablet   No No   Sig: Take 3 tablets (600 mg total) by mouth every 6 (six) hours as needed for moderate pain   medroxyPROGESTERone (DEPO-PROVERA) 150 mg/mL injection   No No   Sig: Inject 1 mL (150 mg total) into a muscle every 3 (three) months   predniSONE 10 mg tablet   No No   Sig: Take 4 tabs PO daily x 3 days, 3 tabs PO daily x 3 days, 2 tabs PO daily x 3 days, 1 tab PO daily x 3 days.  Take in AM with food. Pt needs to crush tablets, cannot swallow wtihout crushing.      Facility-Administered Medications: None       Past Medical History:   Diagnosis Date    Abnormal Pap smear of cervix     HGSIL 2016    Anesthesia     \"woke up with 4 procedures/lithotripsy(coded)/bronchoscopy/ cyst removal and wisdom teeth \" \"also high anxiety\"    Anxiety     panic attack on occas/gets worked up    Asthma     \"controlled'    Cut of upper extremity     arms and legs from landscaping yest 9/11/pt to notify Dr Reid office today    Cyst in neck at birth     Dental crowns present     DVT (deep vein thrombosis) in pregnancy     1999 in right arm and 2008 left leg    Environmental and seasonal allergies     Hemorrhoids during pregnancy     History of kidney stones     History of palpitations     \"with anxiety\"    HPV (human papilloma virus) infection     Hx of blood clots     during 2 pregnancies 1999/ and 2008/no longer on lovenox    Intrahepatic cholestasis of pregnancy 5/22/2019    Low back pain     occas    Lumbar herniated disc     Shortness of breath     sometimes    Thyroid cyst     Varicella     as child    Wears glasses     Wears partial " dentures     upper       Past Surgical History:   Procedure Laterality Date    BRONCHOSCOPY      CERVICAL BIOPSY  W/ LOOP ELECTRODE EXCISION  2016    CYST REMOVAL      LITHOTRIPSY      pf both kidneys, stents placed    HI LAPAROSCOPY W/RMVL ADNEXAL STRUCTURES Bilateral 9/13/2019    Procedure: SALPINGECTOMY, LAPAROSCOPIC;  Surgeon: Gulshan Reid MD;  Location: [facility];  Service: Gynecology    TONSILECTOMY AND ADNOIDECTOMY      WISDOM TOOTH EXTRACTION         Family History   Problem Relation Age of Onset    Leukemia Mother     No Known Problems Father     No Known Problems Sister      I have reviewed and agree with the history as documented.    E-Cigarette/Vaping    E-Cigarette Use Never User      E-Cigarette/Vaping Substances    Nicotine No     THC No     CBD No     Flavoring No     Other No     Unknown No      Social History     Tobacco Use    Smoking status: Every Day     Current packs/day: 0.50     Types: Cigarettes    Smokeless tobacco: Never   Vaping Use    Vaping status: Never Used   Substance Use Topics    Alcohol use: Yes    Drug use: No       Review of Systems   Constitutional:  Negative for activity change, appetite change, chills, diaphoresis, fatigue, fever and weight loss.   HENT:  Negative for congestion, ear discharge, ear pain, mouth sores, postnasal drip, rhinorrhea and sore throat.    Eyes:  Negative for pain, discharge, redness and itching.   Respiratory:  Positive for cough, shortness of breath and wheezing. Negative for chest tightness.    Cardiovascular:  Negative for chest pain.   Gastrointestinal:  Negative for abdominal pain, diarrhea, nausea and vomiting.   Musculoskeletal:  Negative for myalgias.   Skin:  Negative for color change and rash.   Neurological:  Negative for headaches.   Psychiatric/Behavioral:  Negative for confusion.    All other systems reviewed and are negative.      Physical Exam  Physical Exam  Vitals and nursing note reviewed.   Constitutional:       General:  She is not in acute distress.     Appearance: Normal appearance. She is not ill-appearing, toxic-appearing or diaphoretic.   HENT:      Head: Normocephalic.      Right Ear: External ear normal.      Left Ear: External ear normal.      Nose: Nose normal.      Mouth/Throat:      Pharynx: No oropharyngeal exudate or posterior oropharyngeal erythema.   Cardiovascular:      Rate and Rhythm: Normal rate and regular rhythm.      Heart sounds: Normal heart sounds.   Pulmonary:      Comments: Decreased breath sound bilaterally  Musculoskeletal:      Cervical back: Neck supple.      Right lower leg: No edema.      Left lower leg: No edema.   Lymphadenopathy:      Cervical: No cervical adenopathy.   Skin:     General: Skin is warm.      Capillary Refill: Capillary refill takes less than 2 seconds.      Findings: No rash.   Neurological:      General: No focal deficit present.      Mental Status: She is alert and oriented to person, place, and time.   Psychiatric:         Mood and Affect: Mood normal.         Behavior: Behavior normal.         Thought Content: Thought content normal.         Judgment: Judgment normal.         Vital Signs  ED Triage Vitals [09/08/24 1142]   Temperature Pulse Respirations Blood Pressure SpO2   98.3 °F (36.8 °C) 89 19 135/73 98 %      Temp Source Heart Rate Source Patient Position - Orthostatic VS BP Location FiO2 (%)   Oral Monitor -- -- --      Pain Score       --           Vitals:    09/08/24 1142   BP: 135/73   Pulse: 89         Visual Acuity      ED Medications  Medications   ipratropium-albuterol (DUO-NEB) 0.5-2.5 mg/3 mL inhalation solution 3 mL (3 mL Nebulization Given 9/8/24 1318)   prednisoLONE (ORAPRED) oral solution 45 mg (45 mg Oral Given 9/8/24 1317)       Diagnostic Studies  Results Reviewed       None                   No orders to display              Procedures  Procedures         ED Course                                               Medical Decision Making  This 44-year-old  female presents the emergency room with a history of asthma.  She states that she has been hospitalized as a young child but has not had any problems for years.  She is an active smoker.  She ran out of her albuterol for her nebulizer.  She states that she has had a nonproductive cough and wheezing for the past 3 weeks.  She states her family is also sick.  She has not had any fever or chills.  She denies any nasal congestion or postnasal drip.  She states she gets bronchitis every year.  Her mom was just diagnosed with bronchitis yesterday.  She never been intubated for her asthma.  She states that she has a swallowing problem that is being worked up by her physician.  She states that she does not swallow pills.  She needs liquid.  She denies any chest pain.  She complains of shortness of breath because she feels like she cannot take a deep breath.    Past medical history is positive for an abnormal Pap smear, anxiety, asthma, cyst in her neck at birth, dental crowns present, DVT, hemorrhoids, kidney stones, palpitations, HPV, intrahepatic cholestasis of pregnancy, low back pain, herniated disc, shortness of breath, varicella, thyroid cyst, wears glasses, wears dentures partial upper  .  Physical exam this 44-year-old female is alert and oriented x 3 3.  She is in no acute distress.  Posterior pharynx is nonerythematous.  Her airway is patent.  She is her neck is supple palpation without lymphadenopathy.  There is no nuchal rigidity.  Her lungs have distant breath sounds with inspiration and expiration with poor exchange.  She is satting 98% on room air.  She is not tachypneic.  She has no intercostal retractions and speaks in full sentences.  She moves her upper and lower extremities freely.  She has a normal gait.    Plan to do a pre and postpeak flow.  Nebulization with albuterol and Atrovent and reassess    Reassessment, patient's peak flow was 275 prior to the albuterol and Atrovent nebulizer.  Her post neb  peak flow was 300.    Differential diagnosis includes but is not limited to acute exacerbation of asthma, bronchitis, reactive airway, bronchospasm, pneumonia, COVID, RSV, flu, viral illness    Impression  Acute exacerbation of asthma  Acute asthmatic bronchitis    Plan  Patient was given a refill for her albuterol.  She will check a peak flow at if she feels tight.  If her peak flow is below 300, she will repeat her neb x 2.  If she remains under 300, she is to return to the emergency room for repeat exam.  She should be above 300 as her predicted peak.  She was given a prescription for Zithromax as well as Orapred as she can only take liquids.  She was given instructions and reasons to return to the emergency room should her symptoms worsen.  I advised her to quit smoking.  Told her that she should call her family physician to have a repeat exam to discuss Chantix NicoDerm patches.    Risk  Prescription drug management.                 Disposition  Final diagnoses:   Acute bronchitis   Asthma     Time reflects when diagnosis was documented in both MDM as applicable and the Disposition within this note       Time User Action Codes Description Comment    9/8/2024  1:49 PM Gutzweiler, Julie Add [J20.9] Acute bronchitis     9/8/2024  1:54 PM Gutzweiler, Julie Add [J45.909] Asthma           ED Disposition       ED Disposition   Discharge    Condition   Stable    Date/Time   Sun Sep 8, 2024  1:56 PM    Comment   Henny Mack discharge to home/self care.                   Follow-up Information    None         Discharge Medication List as of 9/8/2024  1:56 PM        START taking these medications    Details   albuterol (2.5 mg/3 mL) 0.083 % nebulizer solution Take 3 mL (2.5 mg total) by nebulization every 6 (six) hours as needed for wheezing or shortness of breath, Starting Sun 9/8/2024, Normal      azithromycin (ZITHROMAX) 100 mg/5 mL suspension Multiple Dosages:Starting Sun 9/8/2024, Until Sun 9/8/2024 at  2359, THEN Starting Mon 9/9/2024, Until Thu 9/12/2024 at 2359Take 25 mL (500 mg total) by mouth daily for 1 day, THEN 12.5 mL (250 mg total) daily for 4 days., Normal      prednisoLONE (ORAPRED) 15 mg/5 mL oral solution Take 15 mL (45 mg total) by mouth daily for 4 days Start tomorrow, first dose given in the emergency room, Starting Sun 9/8/2024, Until Thu 9/12/2024, Normal           CONTINUE these medications which have NOT CHANGED    Details   acetaminophen (TYLENOL) 325 mg tablet Take 2 tablets (650 mg total) by mouth every 6 (six) hours as needed for mild pain or headaches, Starting Fri 9/13/2019, No Print      baclofen 10 mg tablet Take 1 tablet (10 mg total) by mouth every 8 (eight) hours as needed for muscle spasms (jaw spasm) Crush tablet into apple sauce or pudding., Starting Thu 10/5/2023, Normal      calcium carbonate (TUMS) 500 mg chewable tablet Chew 1 tablet daily as needed for indigestion or heartburn, Historical Med      cetirizine (ZyrTEC) 10 mg tablet Take 10 mg by mouth daily, Historical Med      famotidine (PEPCID) 20 mg tablet Take 1 tablet (20 mg total) by mouth 2 (two) times a day for 7 days, Starting Wed 8/16/2023, Until Wed 8/23/2023, Normal      ibuprofen (MOTRIN) 200 mg tablet Take 3 tablets (600 mg total) by mouth every 6 (six) hours as needed for moderate pain, Starting Fri 9/13/2019, No Print      medroxyPROGESTERone (DEPO-PROVERA) 150 mg/mL injection Inject 1 mL (150 mg total) into a muscle every 3 (three) months, Starting Thu 7/18/2019, Normal      predniSONE 10 mg tablet Take 4 tabs PO daily x 3 days, 3 tabs PO daily x 3 days, 2 tabs PO daily x 3 days, 1 tab PO daily x 3 days.  Take in AM with food. Pt needs to crush tablets, cannot swallow wtihout crushing., Normal             No discharge procedures on file.    PDMP Review       None            ED Provider  Electronically Signed by             Julie Lynn Gutzweiler, PA-C  09/08/24 1955

## 2024-09-08 NOTE — DISCHARGE INSTRUCTIONS
Please use your peak flow at home.  He should always be blowing above 150.   or below and your peak flow repeat your nebulizer.  If it does not increase above 250, return to the emergency room for repeat exam

## 2024-09-08 NOTE — ED NOTES
Peak flow pre breathing treatment - 275  Peak flow post treatment - 350      Sujata Burgos RN  09/08/24 [id number]

## 2025-02-15 ENCOUNTER — OFFICE VISIT (OUTPATIENT)
Dept: URGENT CARE | Facility: CLINIC | Age: 45
End: 2025-02-15
Payer: COMMERCIAL

## 2025-02-15 VITALS
RESPIRATION RATE: 18 BRPM | HEIGHT: 65 IN | SYSTOLIC BLOOD PRESSURE: 118 MMHG | HEART RATE: 86 BPM | WEIGHT: 180.8 LBS | OXYGEN SATURATION: 99 % | BODY MASS INDEX: 30.12 KG/M2 | DIASTOLIC BLOOD PRESSURE: 63 MMHG | TEMPERATURE: 97.7 F

## 2025-02-15 DIAGNOSIS — K04.7 DENTAL ABSCESS: Primary | ICD-10-CM

## 2025-02-15 PROCEDURE — 99213 OFFICE O/P EST LOW 20 MIN: CPT | Performed by: FAMILY MEDICINE

## 2025-02-15 RX ORDER — CLINDAMYCIN HYDROCHLORIDE 300 MG/1
300 CAPSULE ORAL 4 TIMES DAILY
Qty: 28 CAPSULE | Refills: 0 | Status: SHIPPED | OUTPATIENT
Start: 2025-02-15 | End: 2025-02-22

## 2025-02-15 NOTE — PROGRESS NOTES
[facility]        NAME: Henny Mack is a 44 y.o. female  : 1980    MRN: [id number]  DATE: February 15, 2025  TIME: 3:29 PM    Assessment and Plan   Dental abscess [K04.7]  1. Dental abscess  clindamycin (CLEOCIN) 300 MG capsule            Patient Instructions       Follow up with PCP in 3-5 days.  Proceed to  ER if symptoms worsen.    If tests have been performed at [facility], our office will contact you with results if changes need to be made to the care plan discussed with you at the visit.  [facility]    Chief Complaint     Chief Complaint   Patient presents with    Dental Pain     Pt stated she has had a bad tooth for a while now, pt cracked her tooth and now stating she had grown an abscess and she feels a pressure and it going into her jaw.          History of Present Illness       44-year-old female presenting with tooth pain.  She reports breaking her left incisor 3 days ago.  Since that time she has noticed increased pain, redness and swelling.  She is awaiting establishment with a dentist.        Review of Systems   Review of Systems   Constitutional: Negative.    HENT:  Positive for dental problem.    Eyes: Negative.    Respiratory: Negative.     Cardiovascular: Negative.    Gastrointestinal: Negative.    Genitourinary: Negative.    Skin: Negative.    Allergic/Immunologic: Negative.    Neurological: Negative.    Hematological: Negative.    Psychiatric/Behavioral: Negative.           Current Medications       Current Outpatient Medications:     clindamycin (CLEOCIN) 300 MG capsule, Take 1 capsule (300 mg total) by mouth 4 (four) times a day for 7 days, Disp: 28 capsule, Rfl: 0    acetaminophen (TYLENOL) 325 mg tablet, Take 2 tablets (650 mg total) by mouth every 6 (six) hours as needed for mild pain or headaches, Disp: 30 tablet, Rfl: 0    albuterol (2.5 mg/3 mL) 0.083 % nebulizer solution, Take 3 mL (2.5 mg total) by nebulization  "every 6 (six) hours as needed for wheezing or shortness of breath, Disp: 75 mL, Rfl: 0    baclofen 10 mg tablet, Take 1 tablet (10 mg total) by mouth every 8 (eight) hours as needed for muscle spasms (jaw spasm) Crush tablet into apple sauce or pudding., Disp: 15 tablet, Rfl: 0    calcium carbonate (TUMS) 500 mg chewable tablet, Chew 1 tablet daily as needed for indigestion or heartburn, Disp: , Rfl:     cetirizine (ZyrTEC) 10 mg tablet, Take 10 mg by mouth daily, Disp: , Rfl:     famotidine (PEPCID) 20 mg tablet, Take 1 tablet (20 mg total) by mouth 2 (two) times a day for 7 days, Disp: 14 tablet, Rfl: 0    ibuprofen (MOTRIN) 200 mg tablet, Take 3 tablets (600 mg total) by mouth every 6 (six) hours as needed for moderate pain, Disp: , Rfl: 0    medroxyPROGESTERone (DEPO-PROVERA) 150 mg/mL injection, Inject 1 mL (150 mg total) into a muscle every 3 (three) months, Disp: 1 mL, Rfl: 0    predniSONE 10 mg tablet, Take 4 tabs PO daily x 3 days, 3 tabs PO daily x 3 days, 2 tabs PO daily x 3 days, 1 tab PO daily x 3 days.  Take in AM with food. Pt needs to crush tablets, cannot swallow wtihout crushing., Disp: 30 tablet, Rfl: 0    Current Allergies     Allergies as of 02/15/2025 - Reviewed 02/15/2025   Allergen Reaction Noted    Bee venom Throat Swelling 09/22/2016    Codeine Anaphylaxis and GI Intolerance 05/01/2005    Penicillins Anaphylaxis 03/30/2016    Pineapple flavor - food allergy Throat Swelling 05/16/2014    Sulfa antibiotics Rash 03/30/2016    Vancomycin Anaphylaxis 03/30/2016    Iodinated contrast media Palpitations 03/30/2016    Metrizamide Palpitations 03/30/2016            The following portions of the patient's history were reviewed and updated as appropriate: allergies, current medications, past family history, past medical history, past social history, past surgical history and problem list.     Past Medical History:   Diagnosis Date    Abnormal Pap smear of cervix     HGSIL 2016    Anesthesia     \"woke " "up with 4 procedures/lithotripsy(coded)/bronchoscopy/ cyst removal and wisdom teeth \" \"also high anxiety\"    Anxiety     panic attack on occas/gets worked up    Asthma     \"controlled'    Cut of upper extremity     arms and legs from landscaping yest 9/11/pt to notify Dr Reid office today    Cyst in neck at birth     Dental crowns present     DVT (deep vein thrombosis) in pregnancy     1999 in right arm and 2008 left leg    Environmental and seasonal allergies     Hemorrhoids during pregnancy     History of kidney stones     History of palpitations     \"with anxiety\"    HPV (human papilloma virus) infection     Hx of blood clots     during 2 pregnancies 1999/ and 2008/no longer on lovenox    Intrahepatic cholestasis of pregnancy 5/22/2019    Low back pain     occas    Lumbar herniated disc     Shortness of breath     sometimes    Thyroid cyst     Varicella     as child    Wears glasses     Wears partial dentures     upper       Past Surgical History:   Procedure Laterality Date    BRONCHOSCOPY      CERVICAL BIOPSY  W/ LOOP ELECTRODE EXCISION  2016    CYST REMOVAL      LITHOTRIPSY      pf both kidneys, stents placed    TX LAPAROSCOPY W/RMVL ADNEXAL STRUCTURES Bilateral 9/13/2019    Procedure: SALPINGECTOMY, LAPAROSCOPIC;  Surgeon: Gulshan Reid MD;  Location: [facility];  Service: Gynecology    TONSILECTOMY AND ADNOIDECTOMY      WISDOM TOOTH EXTRACTION         Family History   Problem Relation Age of Onset    Leukemia Mother     No Known Problems Father     No Known Problems Sister          Medications have been verified.        Objective   /63   Pulse 86   Temp 97.7 °F (36.5 °C)   Resp 18   Ht 5' 5\" (1.651 m)   Wt 82 kg (180 lb 12.8 oz)   SpO2 99%   BMI 30.09 kg/m²   No LMP recorded.       Physical Exam     Physical Exam  Vitals and nursing note reviewed.   Constitutional:       Appearance: She is well-developed.   HENT:      Head: Normocephalic.      Nose: Nose normal.      Mouth/Throat:    "   Dentition: Dental tenderness, gingival swelling and dental abscesses present.     Eyes:      Pupils: Pupils are equal, round, and reactive to light.   Cardiovascular:      Rate and Rhythm: Normal rate.   Pulmonary:      Effort: Pulmonary effort is normal.   Abdominal:      General: Abdomen is flat.   Musculoskeletal:         General: Normal range of motion.      Cervical back: Normal range of motion.   Skin:     General: Skin is warm and dry.   Neurological:      Mental Status: She is alert and oriented to person, place, and time.

## (undated) DEVICE — SCD SEQUENTIAL COMPRESSION COMFORT SLEEVE MEDIUM KNEE LENGTH: Brand: KENDALL SCD

## (undated) DEVICE — ADHESIVE SKIN HIGH VISCOSITY EXOFIN 1ML

## (undated) DEVICE — TROCAR: Brand: KII® SLEEVE

## (undated) DEVICE — PVC URETHRAL CATHETER: Brand: DOVER

## (undated) DEVICE — BETHLEHEM UNIVERSAL GYN LAP PK: Brand: CARDINAL HEALTH

## (undated) DEVICE — [HIGH FLOW INSUFFLATOR,  DO NOT USE IF PACKAGE IS DAMAGED,  KEEP DRY,  KEEP AWAY FROM SUNLIGHT,  PROTECT FROM HEAT AND RADIOACTIVE SOURCES.]: Brand: PNEUMOSURE

## (undated) DEVICE — STERILE 8 INCH PROCTO SWAB: Brand: CARDINAL HEALTH

## (undated) DEVICE — DRAPE EQUIPMENT RF WAND

## (undated) DEVICE — ADHESIVE SKN CLSR HISTOACRYL FLEX 0.5ML LF

## (undated) DEVICE — TROCAR: Brand: KII FIOS FIRST ENTRY

## (undated) DEVICE — SUT MONOCRYL 4-0 PS-2 27 IN Y426H

## (undated) DEVICE — CHLORAPREP HI-LITE 26ML ORANGE

## (undated) DEVICE — ENSEAL LAPAROSCOPIC TISSUE SEALER G2 STRAIGHT JAW FOR USE WITH G2 GENERATOR 5MM DIAMETER 35CM SHAFT LENGTH: Brand: ENSEAL

## (undated) DEVICE — INTENDED FOR TISSUE SEPARATION, AND OTHER PROCEDURES THAT REQUIRE A SHARP SURGICAL BLADE TO PUNCTURE OR CUT.: Brand: BARD-PARKER SAFETY BLADES SIZE 11, STERILE

## (undated) DEVICE — 3000CC GUARDIAN II: Brand: GUARDIAN

## (undated) DEVICE — BLUE HEAT SCOPE WARMER

## (undated) DEVICE — VIAL DECANTER

## (undated) DEVICE — PREMIUM DRY TRAY LF: Brand: MEDLINE INDUSTRIES, INC.